# Patient Record
Sex: FEMALE | Race: WHITE | Employment: OTHER | URBAN - METROPOLITAN AREA
[De-identification: names, ages, dates, MRNs, and addresses within clinical notes are randomized per-mention and may not be internally consistent; named-entity substitution may affect disease eponyms.]

---

## 2017-04-20 ENCOUNTER — TELEPHONE (OUTPATIENT)
Dept: OTHER | Age: 82
End: 2017-04-20

## 2017-04-24 ENCOUNTER — OFFICE VISIT (OUTPATIENT)
Dept: CARDIOLOGY CLINIC | Age: 82
End: 2017-04-24

## 2017-04-24 VITALS
SYSTOLIC BLOOD PRESSURE: 118 MMHG | HEIGHT: 59 IN | DIASTOLIC BLOOD PRESSURE: 50 MMHG | WEIGHT: 124 LBS | HEART RATE: 78 BPM | BODY MASS INDEX: 25 KG/M2 | OXYGEN SATURATION: 94 %

## 2017-04-24 DIAGNOSIS — I50.31 ACUTE DIASTOLIC CHF (CONGESTIVE HEART FAILURE) (HCC): Primary | Chronic | ICD-10-CM

## 2017-04-24 DIAGNOSIS — Z86.79 HISTORY OF AORTIC STENOSIS: ICD-10-CM

## 2017-04-24 PROCEDURE — 99214 OFFICE O/P EST MOD 30 MIN: CPT | Performed by: NURSE PRACTITIONER

## 2017-04-24 RX ORDER — CIPROFLOXACIN 250 MG/1
TABLET, FILM COATED ORAL
COMMUNITY
Start: 2017-04-04 | End: 2017-05-02 | Stop reason: ALTCHOICE

## 2017-04-24 RX ORDER — AMLODIPINE BESYLATE 5 MG/1
5 TABLET ORAL DAILY
Status: ON HOLD | COMMUNITY
Start: 2017-01-30 | End: 2019-01-01 | Stop reason: HOSPADM

## 2017-05-01 ENCOUNTER — HOSPITAL ENCOUNTER (OUTPATIENT)
Dept: OTHER | Age: 82
Discharge: HOME | End: 2017-05-01
Attending: NURSE PRACTITIONER | Admitting: NURSE PRACTITIONER

## 2017-05-01 DIAGNOSIS — I50.31 ACUTE DIASTOLIC CHF (CONGESTIVE HEART FAILURE) (HCC): Chronic | ICD-10-CM

## 2017-05-01 LAB
ANION GAP SERPL CALCULATED.3IONS-SCNC: 14 MMOL/L (ref 3–16)
BUN BLDV-MCNC: 34 MG/DL (ref 7–20)
CALCIUM SERPL-MCNC: 9.2 MG/DL (ref 8.3–10.6)
CHLORIDE BLD-SCNC: 99 MMOL/L (ref 99–110)
CO2: 28 MMOL/L (ref 21–32)
CREAT SERPL-MCNC: 1.1 MG/DL (ref 0.6–1.2)
GFR AFRICAN AMERICAN: 55
GFR NON-AFRICAN AMERICAN: 46
GLUCOSE BLD-MCNC: 182 MG/DL (ref 70–99)
HCT VFR BLD CALC: 31.8 % (ref 36–48)
HEMOGLOBIN: 10.3 G/DL (ref 12–16)
MCH RBC QN AUTO: 29.3 PG (ref 26–34)
MCHC RBC AUTO-ENTMCNC: 32.3 G/DL (ref 31–36)
MCV RBC AUTO: 90.5 FL (ref 80–100)
PDW BLD-RTO: 15.1 % (ref 12.4–15.4)
PLATELET # BLD: 219 K/UL (ref 135–450)
PMV BLD AUTO: 8.7 FL (ref 5–10.5)
POTASSIUM SERPL-SCNC: 5 MMOL/L (ref 3.5–5.1)
RBC # BLD: 3.52 M/UL (ref 4–5.2)
SODIUM BLD-SCNC: 141 MMOL/L (ref 136–145)
WBC # BLD: 5.5 K/UL (ref 4–11)

## 2017-05-03 ENCOUNTER — HOSPITAL ENCOUNTER (OUTPATIENT)
Dept: ONCOLOGY | Age: 82
Discharge: HOME | End: 2017-05-31
Admitting: NURSE PRACTITIONER

## 2017-05-03 ENCOUNTER — OFFICE VISIT (OUTPATIENT)
Dept: CARDIOLOGY CLINIC | Age: 82
End: 2017-05-03

## 2017-05-03 VITALS
OXYGEN SATURATION: 100 % | HEIGHT: 59 IN | BODY MASS INDEX: 25.16 KG/M2 | SYSTOLIC BLOOD PRESSURE: 126 MMHG | WEIGHT: 124.8 LBS | HEART RATE: 76 BPM | DIASTOLIC BLOOD PRESSURE: 52 MMHG | RESPIRATION RATE: 16 BRPM

## 2017-05-03 DIAGNOSIS — I50.32 CHRONIC DIASTOLIC HEART FAILURE (HCC): ICD-10-CM

## 2017-05-03 DIAGNOSIS — I10 ESSENTIAL HYPERTENSION: ICD-10-CM

## 2017-05-03 DIAGNOSIS — R06.02 SHORTNESS OF BREATH: Primary | ICD-10-CM

## 2017-05-03 PROCEDURE — 99214 OFFICE O/P EST MOD 30 MIN: CPT | Performed by: NURSE PRACTITIONER

## 2017-05-03 RX ORDER — FUROSEMIDE 10 MG/ML
40 INJECTION INTRAMUSCULAR; INTRAVENOUS ONCE
Status: COMPLETED | OUTPATIENT
Start: 2017-05-03 | End: 2017-05-03

## 2017-05-03 RX ORDER — SODIUM CHLORIDE 0.9 % (FLUSH) 0.9 %
SYRINGE (ML) INJECTION
Status: DISPENSED
Start: 2017-05-03 | End: 2017-05-04

## 2017-05-03 RX ADMIN — FUROSEMIDE 40 MG: 10 INJECTION INTRAMUSCULAR; INTRAVENOUS at 13:22

## 2017-05-08 ENCOUNTER — HOSPITAL ENCOUNTER (OUTPATIENT)
Dept: OTHER | Age: 82
Discharge: HOME | End: 2017-05-08
Attending: NURSE PRACTITIONER | Admitting: NURSE PRACTITIONER

## 2017-05-08 DIAGNOSIS — R06.02 SHORTNESS OF BREATH: ICD-10-CM

## 2017-05-08 LAB
ANION GAP SERPL CALCULATED.3IONS-SCNC: 17 MMOL/L (ref 3–16)
BUN BLDV-MCNC: 32 MG/DL (ref 7–20)
CALCIUM SERPL-MCNC: 9.6 MG/DL (ref 8.3–10.6)
CHLORIDE BLD-SCNC: 98 MMOL/L (ref 99–110)
CO2: 27 MMOL/L (ref 21–32)
CREAT SERPL-MCNC: 1 MG/DL (ref 0.6–1.2)
GFR AFRICAN AMERICAN: >60
GFR NON-AFRICAN AMERICAN: 51
GLUCOSE BLD-MCNC: 186 MG/DL (ref 70–99)
POTASSIUM SERPL-SCNC: 4.4 MMOL/L (ref 3.5–5.1)
PRO-BNP: 676 PG/ML (ref 0–449)
SODIUM BLD-SCNC: 142 MMOL/L (ref 136–145)

## 2017-05-09 ENCOUNTER — TELEPHONE (OUTPATIENT)
Dept: CARDIOLOGY CLINIC | Age: 82
End: 2017-05-09

## 2017-05-09 DIAGNOSIS — I50.22 CHRONIC SYSTOLIC HEART FAILURE (HCC): Primary | ICD-10-CM

## 2017-05-11 ENCOUNTER — OFFICE VISIT (OUTPATIENT)
Dept: CARDIOLOGY CLINIC | Age: 82
End: 2017-05-11

## 2017-05-11 VITALS
BODY MASS INDEX: 24.39 KG/M2 | DIASTOLIC BLOOD PRESSURE: 70 MMHG | WEIGHT: 121 LBS | SYSTOLIC BLOOD PRESSURE: 110 MMHG | HEIGHT: 59 IN | HEART RATE: 84 BPM

## 2017-05-11 DIAGNOSIS — Z86.79 HISTORY OF AORTIC STENOSIS: ICD-10-CM

## 2017-05-11 DIAGNOSIS — I35.0 NONRHEUMATIC AORTIC VALVE STENOSIS: ICD-10-CM

## 2017-05-11 DIAGNOSIS — R06.02 SHORTNESS OF BREATH: ICD-10-CM

## 2017-05-11 DIAGNOSIS — Z95.2 S/P TAVR (TRANSCATHETER AORTIC VALVE REPLACEMENT): Primary | ICD-10-CM

## 2017-05-11 PROCEDURE — 99213 OFFICE O/P EST LOW 20 MIN: CPT | Performed by: INTERNAL MEDICINE

## 2017-05-16 ENCOUNTER — OFFICE VISIT (OUTPATIENT)
Dept: PULMONOLOGY | Age: 82
End: 2017-05-16

## 2017-05-16 VITALS — DIASTOLIC BLOOD PRESSURE: 77 MMHG | SYSTOLIC BLOOD PRESSURE: 140 MMHG | OXYGEN SATURATION: 95 % | HEART RATE: 92 BPM

## 2017-05-16 DIAGNOSIS — R06.02 SHORTNESS OF BREATH: Primary | ICD-10-CM

## 2017-05-16 DIAGNOSIS — J96.11 CHRONIC HYPOXEMIC RESPIRATORY FAILURE (HCC): ICD-10-CM

## 2017-05-16 DIAGNOSIS — J44.9 COPD, MILD (HCC): ICD-10-CM

## 2017-05-16 DIAGNOSIS — J43.2 CENTRILOBULAR EMPHYSEMA (HCC): ICD-10-CM

## 2017-05-16 PROCEDURE — 99204 OFFICE O/P NEW MOD 45 MIN: CPT | Performed by: INTERNAL MEDICINE

## 2017-05-16 RX ORDER — ALBUTEROL SULFATE 2.5 MG/3ML
2.5 SOLUTION RESPIRATORY (INHALATION) 3 TIMES DAILY
COMMUNITY

## 2017-05-16 ASSESSMENT — ENCOUNTER SYMPTOMS
NAUSEA: 0
SINUS PRESSURE: 0
DIARRHEA: 0
CONSTIPATION: 0
ABDOMINAL PAIN: 0

## 2017-05-22 ENCOUNTER — HOSPITAL ENCOUNTER (OUTPATIENT)
Dept: OTHER | Age: 82
Discharge: HOME | End: 2017-05-22
Attending: NURSE PRACTITIONER | Admitting: NURSE PRACTITIONER

## 2017-05-22 DIAGNOSIS — I50.22 CHRONIC SYSTOLIC HEART FAILURE (HCC): ICD-10-CM

## 2017-05-22 LAB
ANION GAP SERPL CALCULATED.3IONS-SCNC: 15 MMOL/L (ref 3–16)
BUN BLDV-MCNC: 34 MG/DL (ref 7–20)
CALCIUM SERPL-MCNC: 9.3 MG/DL (ref 8.3–10.6)
CHLORIDE BLD-SCNC: 99 MMOL/L (ref 99–110)
CO2: 27 MMOL/L (ref 21–32)
CREAT SERPL-MCNC: 1.2 MG/DL (ref 0.6–1.2)
GFR AFRICAN AMERICAN: 50
GFR NON-AFRICAN AMERICAN: 41
GLUCOSE BLD-MCNC: 190 MG/DL (ref 70–99)
POTASSIUM SERPL-SCNC: 4.9 MMOL/L (ref 3.5–5.1)
PRO-BNP: 519 PG/ML (ref 0–449)
SODIUM BLD-SCNC: 141 MMOL/L (ref 136–145)

## 2017-05-23 ENCOUNTER — TELEPHONE (OUTPATIENT)
Dept: CARDIOLOGY CLINIC | Age: 82
End: 2017-05-23

## 2017-05-24 RX ORDER — ALBUTEROL SULFATE 2.5 MG/3ML
2.5 SOLUTION RESPIRATORY (INHALATION) 3 TIMES DAILY
Qty: 100 EACH | Refills: 11 | Status: SHIPPED | OUTPATIENT
Start: 2017-05-24 | End: 2017-07-19 | Stop reason: SDUPTHER

## 2017-07-20 RX ORDER — ALBUTEROL SULFATE 2.5 MG/3ML
SOLUTION RESPIRATORY (INHALATION)
Qty: 750 ML | Refills: 11 | Status: SHIPPED | OUTPATIENT
Start: 2017-07-20 | End: 2017-08-16 | Stop reason: ALTCHOICE

## 2017-08-15 ENCOUNTER — HOSPITAL ENCOUNTER (OUTPATIENT)
Dept: OTHER | Age: 82
Discharge: HOME | End: 2017-08-15
Attending: INTERNAL MEDICINE | Admitting: INTERNAL MEDICINE

## 2017-08-15 LAB
A/G RATIO: 1.2 (ref 1.1–2.2)
ALBUMIN SERPL-MCNC: 3.9 G/DL (ref 3.4–5)
ALP BLD-CCNC: 97 U/L (ref 40–129)
ALT SERPL-CCNC: 9 U/L (ref 10–40)
ANION GAP SERPL CALCULATED.3IONS-SCNC: 15 MMOL/L (ref 3–16)
AST SERPL-CCNC: 14 U/L (ref 15–37)
BILIRUB SERPL-MCNC: <0.2 MG/DL (ref 0–1)
BUN BLDV-MCNC: 36 MG/DL (ref 7–20)
CALCIUM SERPL-MCNC: 9.1 MG/DL (ref 8.3–10.6)
CHLORIDE BLD-SCNC: 97 MMOL/L (ref 99–110)
CO2: 26 MMOL/L (ref 21–32)
CREAT SERPL-MCNC: 1.2 MG/DL (ref 0.6–1.2)
GFR AFRICAN AMERICAN: 50
GFR NON-AFRICAN AMERICAN: 41
GLOBULIN: 3.2 G/DL
GLUCOSE BLD-MCNC: 129 MG/DL (ref 70–99)
HCT VFR BLD CALC: 32.5 % (ref 36–48)
HEMOGLOBIN: 10.8 G/DL (ref 12–16)
MCH RBC QN AUTO: 30 PG (ref 26–34)
MCHC RBC AUTO-ENTMCNC: 33.2 G/DL (ref 31–36)
MCV RBC AUTO: 90.4 FL (ref 80–100)
PDW BLD-RTO: 14.2 % (ref 12.4–15.4)
PLATELET # BLD: 234 K/UL (ref 135–450)
PMV BLD AUTO: 8.8 FL (ref 5–10.5)
POTASSIUM SERPL-SCNC: 5 MMOL/L (ref 3.5–5.1)
RBC # BLD: 3.6 M/UL (ref 4–5.2)
SODIUM BLD-SCNC: 138 MMOL/L (ref 136–145)
TOTAL PROTEIN: 7.1 G/DL (ref 6.4–8.2)
WBC # BLD: 6.6 K/UL (ref 4–11)

## 2017-08-16 ENCOUNTER — OFFICE VISIT (OUTPATIENT)
Dept: PULMONOLOGY | Age: 82
End: 2017-08-16

## 2017-08-16 VITALS — HEART RATE: 78 BPM | SYSTOLIC BLOOD PRESSURE: 150 MMHG | OXYGEN SATURATION: 93 % | DIASTOLIC BLOOD PRESSURE: 59 MMHG

## 2017-08-16 DIAGNOSIS — J96.11 CHRONIC HYPOXEMIC RESPIRATORY FAILURE (HCC): ICD-10-CM

## 2017-08-16 DIAGNOSIS — R06.02 SHORTNESS OF BREATH: Primary | ICD-10-CM

## 2017-08-16 DIAGNOSIS — K44.9 HIATAL HERNIA: ICD-10-CM

## 2017-08-16 DIAGNOSIS — J44.9 COPD, MILD (HCC): ICD-10-CM

## 2017-08-16 LAB
ESTIMATED AVERAGE GLUCOSE: 180 MG/DL
HBA1C MFR BLD: 7.9 %

## 2017-08-16 PROCEDURE — 99214 OFFICE O/P EST MOD 30 MIN: CPT | Performed by: INTERNAL MEDICINE

## 2017-08-16 RX ORDER — DONEPEZIL HYDROCHLORIDE 5 MG/1
10 TABLET, FILM COATED ORAL DAILY
Status: ON HOLD | COMMUNITY
Start: 2017-06-24 | End: 2019-01-01 | Stop reason: HOSPADM

## 2017-11-21 ENCOUNTER — OFFICE VISIT (OUTPATIENT)
Dept: CARDIOLOGY CLINIC | Age: 82
End: 2017-11-21

## 2017-11-21 VITALS
SYSTOLIC BLOOD PRESSURE: 138 MMHG | HEIGHT: 59 IN | WEIGHT: 117 LBS | BODY MASS INDEX: 23.59 KG/M2 | HEART RATE: 90 BPM | DIASTOLIC BLOOD PRESSURE: 62 MMHG

## 2017-11-21 DIAGNOSIS — Z95.2 S/P TAVR (TRANSCATHETER AORTIC VALVE REPLACEMENT): Primary | ICD-10-CM

## 2017-11-21 DIAGNOSIS — I65.29 OCCLUSION AND STENOSIS OF CAROTID ARTERY: ICD-10-CM

## 2017-11-21 DIAGNOSIS — I65.23 OCCLUSION AND STENOSIS OF BILATERAL CAROTID ARTERIES: ICD-10-CM

## 2017-11-21 DIAGNOSIS — I35.0 NONRHEUMATIC AORTIC VALVE STENOSIS: ICD-10-CM

## 2017-11-21 DIAGNOSIS — Z86.79 HISTORY OF AORTIC STENOSIS: ICD-10-CM

## 2017-11-21 DIAGNOSIS — R06.02 SHORTNESS OF BREATH: ICD-10-CM

## 2017-11-21 PROCEDURE — 99213 OFFICE O/P EST LOW 20 MIN: CPT | Performed by: INTERNAL MEDICINE

## 2017-11-21 RX ORDER — NATEGLINIDE 60 MG/1
TABLET ORAL
Refills: 5 | COMMUNITY
Start: 2017-10-25

## 2017-11-21 NOTE — PROGRESS NOTES
[address]   Cardiac Consultation    Referring Provider:  Connor Walters MD     Chief Complaint   Patient presents with    Congestive Heart Failure     No cardiac complaints    Shortness of Breath        History of Present Illness:   Mrs Beatrice Nice is a 80year old female seen today in follow up for her severe aortic stenosis. In March 2014, she came to the ER for an episode of CHF, treated with diuretics. At this time she was found to have iron deficient anemia (8.9/27.9) and placed on iron therapy. She had TAVR with Huizar valve done in 5/2015. She is a nondrinker, nonsmoker. Lives with her daughter. Today, Ben Boyle Denies chest discomfort, SOB, change in exercise tolerance, palpitations or syncope. Patient has been advised on the importance of regular exercise of at least 20-30 minutes daily alternating with aerobic and isometric activities. Past Medical History:   has a past medical history of Back pain; Cancer Skin; CHF (congestive heart failure) ([address] COPD, mild ([address] Diabetes mellitus ([address] Hiatal hernia; Hyperlipidemia; and Hypertension. Surgical History:   has a past surgical history that includes Carpal tunnel release; Hysterectomy; skin biopsy; Cataract removal; and Intertrochanteric hip fract. Surgery (Left, 5/12/2016). Social History:   reports that she has never smoked. She has never used smokeless tobacco. She reports that she does not drink alcohol or use drugs. Family History:  family history includes High Blood Pressure in her sister; Stroke in her brother and sister.      Home Medications:  Outpatient Encounter Prescriptions as of 11/21/2017   Medication Sig Dispense Refill    nateglinide (STARLIX) 60 MG tablet   5    donepezil (ARICEPT) 5 MG tablet Take 1 tablet by mouth daily      albuterol (PROVENTIL) (2.5 MG/3ML) 0.083% nebulizer solution Take 2.5 mg by nebulization three times daily      SITagliptin (JANUVIA) 50 MG tablet Take 50 mg by mouth daily  furosemide (LASIX) 20 MG tablet Take 20 mg by mouth every other day      amLODIPine (NORVASC) 2.5 MG tablet Take 5 mg by mouth daily      albuterol sulfate  (90 BASE) MCG/ACT inhaler Inhale 2 puffs into the lungs every 6 hours as needed for Wheezing      budesonide-formoterol (SYMBICORT) 80-4.5 MCG/ACT AERO Inhale 2 puffs into the lungs 2 times daily      OXYGEN Inhale into the lungs 2 l by cannula 24/7      aspirin 81 MG EC tablet Take 81 mg by mouth daily      pravastatin (PRAVACHOL) 40 MG tablet Take 40 mg by mouth daily      rOPINIRole (REQUIP) 0.5 MG tablet Take 1 mg by mouth nightly Takes 1 or 2 in the evening      ALPRAZolam (XANAX) 0.25 MG tablet Take 0.25 mg by mouth 2 times daily       lisinopril (PRINIVIL;ZESTRIL) 5 MG tablet Take 10 mg by mouth daily       vitamin D (CHOLECALCIFEROL) 1000 UNIT TABS tablet   Take 50,000 Units by mouth once a week       Ferrous Sulfate (IRON) 90 (18 FE) MG TABS Take 1 capsule by mouth 2 times daily       ondansetron (ZOFRAN) 4 MG tablet Take 4 mg by mouth every 8 hours as needed for Nausea or Vomiting (PRN)        No facility-administered encounter medications on file as of 11/21/2017. Allergies:  Review of patient's allergies indicates no known allergies. Review of Systems:   · Constitutional: there has been no unanticipated weight loss. There's been no change in energy level, sleep pattern, or activity level. · Eyes: No visual changes or diplopia. No scleral icterus. · ENT: No Headaches, hearing loss or vertigo. No mouth sores or sore throat. · Cardiovascular: Reviewed in HPI  · Respiratory: No cough or wheezing, no sputum production. No hematemesis. · Gastrointestinal: No abdominal pain, appetite loss, blood in stools. No change in bowel or bladder habits. · Genitourinary: No dysuria, trouble voiding, or hematuria. · Musculoskeletal:  No gait disturbance, weakness or joint complaints.   · Integumentary: No rash or

## 2017-12-05 ENCOUNTER — HOSPITAL ENCOUNTER (OUTPATIENT)
Dept: VASCULAR LAB | Age: 82
Discharge: HOME | End: 2017-12-05
Attending: INTERNAL MEDICINE | Admitting: INTERNAL MEDICINE

## 2017-12-05 DIAGNOSIS — I65.23 OCCLUSION AND STENOSIS OF BILATERAL CAROTID ARTERIES: ICD-10-CM

## 2017-12-08 ENCOUNTER — HOSPITAL ENCOUNTER (OUTPATIENT)
Dept: VASCULAR LAB | Age: 82
Discharge: HOME | End: 2017-12-08
Attending: INTERNAL MEDICINE | Admitting: INTERNAL MEDICINE

## 2017-12-08 DIAGNOSIS — I65.29 OCCLUSION AND STENOSIS OF CAROTID ARTERY: ICD-10-CM

## 2017-12-08 DIAGNOSIS — I65.29 OCCLUSION AND STENOSIS OF UNSPECIFIED CAROTID ARTERY: ICD-10-CM

## 2017-12-08 DIAGNOSIS — I65.23 OCCLUSION AND STENOSIS OF BILATERAL CAROTID ARTERIES: ICD-10-CM

## 2018-01-01 ENCOUNTER — OFFICE VISIT (OUTPATIENT)
Dept: PULMONOLOGY | Age: 83
End: 2018-01-01

## 2018-01-01 VITALS — DIASTOLIC BLOOD PRESSURE: 58 MMHG | SYSTOLIC BLOOD PRESSURE: 125 MMHG | HEART RATE: 87 BPM | OXYGEN SATURATION: 91 %

## 2018-01-01 DIAGNOSIS — J43.2 CENTRILOBULAR EMPHYSEMA (HCC): ICD-10-CM

## 2018-01-01 DIAGNOSIS — J44.9 COPD, MILD (HCC): ICD-10-CM

## 2018-01-01 DIAGNOSIS — R06.02 SHORTNESS OF BREATH: Primary | ICD-10-CM

## 2018-01-01 DIAGNOSIS — J96.11 CHRONIC HYPOXEMIC RESPIRATORY FAILURE (HCC): ICD-10-CM

## 2018-01-01 PROCEDURE — 99214 OFFICE O/P EST MOD 30 MIN: CPT | Performed by: INTERNAL MEDICINE

## 2018-02-19 ENCOUNTER — OFFICE VISIT (OUTPATIENT)
Dept: PULMONOLOGY | Age: 83
End: 2018-02-19

## 2018-02-19 VITALS
WEIGHT: 117 LBS | RESPIRATION RATE: 18 BRPM | OXYGEN SATURATION: 94 % | HEIGHT: 59 IN | HEART RATE: 89 BPM | SYSTOLIC BLOOD PRESSURE: 150 MMHG | BODY MASS INDEX: 23.59 KG/M2 | DIASTOLIC BLOOD PRESSURE: 69 MMHG

## 2018-02-19 DIAGNOSIS — R06.02 SHORTNESS OF BREATH: Primary | ICD-10-CM

## 2018-02-19 DIAGNOSIS — J44.9 COPD, MILD (HCC): ICD-10-CM

## 2018-02-19 DIAGNOSIS — J96.11 CHRONIC HYPOXEMIC RESPIRATORY FAILURE (HCC): ICD-10-CM

## 2018-02-19 DIAGNOSIS — J43.2 CENTRILOBULAR EMPHYSEMA (HCC): ICD-10-CM

## 2018-02-19 PROCEDURE — 99214 OFFICE O/P EST MOD 30 MIN: CPT | Performed by: INTERNAL MEDICINE

## 2018-02-19 RX ORDER — ONDANSETRON 4 MG/1
4 TABLET, FILM COATED ORAL EVERY 8 HOURS PRN
Qty: 15 TABLET | Refills: 0 | Status: SHIPPED | OUTPATIENT
Start: 2018-02-19 | End: 2018-01-01 | Stop reason: ALTCHOICE

## 2018-02-19 NOTE — PROGRESS NOTES
Encounters:   02/19/18 (!) 150/69   11/21/17 138/62   08/16/17 (!) 150/59        History   Smoking Status    Never Smoker   Smokeless Tobacco    Never Used

## 2018-08-21 NOTE — PROGRESS NOTES
[facility]  Progress Note  Yamile Dukes MD       Nancy Yates   YOB: 1918    Date of Visit:  8/21/2018    Assessment/Plan:  1. Shortness of breath  Overall, she is doing quite well    2. COPD, mild (HCC)  Continue Symbicort and albuterol    3. Chronic hypoxemic respiratory failure (HCC)  O2 sats are acceptable on supplemental O2. The patient benefits from the use of supplemental O2.      4. Hiatal hernia/GERD/aspiration  Continue swallowing precautions as discussed. FOLLOW UP: 6 months      Chief Complaint   Patient presents with    6 Month Follow-Up       HPI  The patient presents with a chief complaint of shortness of breath. The patient is oxygen dependent. She actually has been doing well since her last appointment. She is taking Symbicort twice daily with a spacer. She is taking albuterol 2-3 times daily. The addition of albuterol has been helpful. She is known to have some heart disease and had TAVR in the past as well. Review of Systems  As documented in HPI     Physical Exam:  Well developed, well nourished  Alert and oriented  Sclera is clear  No cervical adenopathy  No JVD. Chest examination is clear. Cardiac examination reveals regular rate and rhythm without murmur, gallop or rub. The abdomen is soft, nontender and nondistended. There is no clubbing, cyanosis or edema of the extremities. There is no obvious skin rash. No focal neuro deficicts  Normal mood and affect    No Known Allergies  Prior to Visit Medications    Medication Sig Taking?  Authorizing Provider   nateglinide (STARLIX) 60 MG tablet  Yes Historical Provider, MD   donepezil (ARICEPT) 5 MG tablet Take 10 mg by mouth daily  Yes Historical Provider, MD   albuterol (PROVENTIL) (2.5 MG/3ML) 0.083% nebulizer solution Take 2.5 mg by nebulization three times daily Yes Historical Provider, MD   SITagliptin (JANUVIA) 50 MG tablet Take 50 mg by mouth daily Yes Historical Provider, MD   furosemide (LASIX) 20 MG tablet Take 20 mg by mouth every other day Yes Historical Provider, MD   amLODIPine (NORVASC) 2.5 MG tablet Take 5 mg by mouth daily Yes Historical Provider, MD   albuterol sulfate  (90 BASE) MCG/ACT inhaler Inhale 2 puffs into the lungs every 6 hours as needed for Wheezing Yes Historical Provider, MD   budesonide-formoterol (SYMBICORT) 80-4.5 MCG/ACT AERO Inhale 2 puffs into the lungs 2 times daily Yes Historical Provider, MD   OXYGEN Inhale into the lungs 2 l by cannula 24/7 Yes Historical Provider, MD   aspirin 81 MG EC tablet Take 81 mg by mouth daily Yes Historical Provider, MD   pravastatin (PRAVACHOL) 40 MG tablet Take 40 mg by mouth daily Yes Historical Provider, MD   rOPINIRole (REQUIP) 0.5 MG tablet Take 1 mg by mouth nightly Takes 1 or 2 in the evening Yes Historical Provider, MD   ALPRAZolam (XANAX) 0.25 MG tablet Take 0.25 mg by mouth 2 times daily  Yes Historical Provider, MD   lisinopril (PRINIVIL;ZESTRIL) 5 MG tablet Take 10 mg by mouth daily  Yes Historical Provider, MD   vitamin D (CHOLECALCIFEROL) 1000 UNIT TABS tablet   Take 50,000 Units by mouth once a week  Yes Historical Provider, MD   Ferrous Sulfate (IRON) 90 (18 FE) MG TABS Take 1 capsule by mouth 2 times daily  Yes Historical Provider, MD       Vitals:    08/21/18 1339   BP: (!) 125/58   Pulse: 87   SpO2: 91%     There is no height or weight on file to calculate BMI.      Wt Readings from Last 3 Encounters:   02/19/18 117 lb (53.1 kg)   11/21/17 117 lb (53.1 kg)   05/11/17 121 lb (54.9 kg)     BP Readings from Last 3 Encounters:   08/21/18 (!) 125/58   02/19/18 (!) 150/69   11/21/17 138/62        History   Smoking Status    Never Smoker   Smokeless Tobacco    Never Used

## 2018-08-21 NOTE — LETTER
[facility], [address] [facility], [address] [place] [address]  Phone: [phone]  Fax: [phone]        August 21, 2018       Patient: Román Araujo   MR Number: [id number]   YOB: 1918   Date of Visit: 8/21/2018       Dear Dr. Kisha Kumarian:    Thank you for the request for consultation for Catalina Weber. Below are the relevant portions of my assessment and plan of care. If you have questions, please do not hesitate to call me. I look forward to following Abilio Randolph along with you.     Sincerely,        Lucio Sprague MD

## 2019-01-01 ENCOUNTER — APPOINTMENT (OUTPATIENT)
Dept: GENERAL RADIOLOGY | Age: 84
DRG: 871 | End: 2019-01-01
Payer: MEDICARE

## 2019-01-01 ENCOUNTER — APPOINTMENT (OUTPATIENT)
Dept: CT IMAGING | Age: 84
DRG: 871 | End: 2019-01-01
Payer: MEDICARE

## 2019-01-01 ENCOUNTER — OFFICE VISIT (OUTPATIENT)
Dept: PULMONOLOGY | Age: 84
End: 2019-01-01
Payer: MEDICARE

## 2019-01-01 ENCOUNTER — HOSPITAL ENCOUNTER (INPATIENT)
Age: 84
LOS: 8 days | Discharge: HOSPICE | DRG: 871 | End: 2019-05-30
Attending: EMERGENCY MEDICINE | Admitting: INTERNAL MEDICINE
Payer: MEDICARE

## 2019-01-01 VITALS
HEART RATE: 112 BPM | DIASTOLIC BLOOD PRESSURE: 73 MMHG | SYSTOLIC BLOOD PRESSURE: 154 MMHG | WEIGHT: 124.2 LBS | HEIGHT: 59 IN | OXYGEN SATURATION: 90 % | BODY MASS INDEX: 25.04 KG/M2 | TEMPERATURE: 97 F | RESPIRATION RATE: 20 BRPM

## 2019-01-01 VITALS — OXYGEN SATURATION: 95 % | HEART RATE: 76 BPM | DIASTOLIC BLOOD PRESSURE: 64 MMHG | SYSTOLIC BLOOD PRESSURE: 139 MMHG

## 2019-01-01 DIAGNOSIS — K44.9 HIATAL HERNIA: ICD-10-CM

## 2019-01-01 DIAGNOSIS — J43.2 CENTRILOBULAR EMPHYSEMA (HCC): ICD-10-CM

## 2019-01-01 DIAGNOSIS — J96.11 CHRONIC HYPOXEMIC RESPIRATORY FAILURE (HCC): ICD-10-CM

## 2019-01-01 DIAGNOSIS — J18.9 MULTIFOCAL PNEUMONIA: ICD-10-CM

## 2019-01-01 DIAGNOSIS — I48.91 RAPID ATRIAL FIBRILLATION (HCC): Primary | ICD-10-CM

## 2019-01-01 DIAGNOSIS — J44.9 COPD, MILD (HCC): ICD-10-CM

## 2019-01-01 DIAGNOSIS — R06.02 SHORTNESS OF BREATH: Primary | ICD-10-CM

## 2019-01-01 DIAGNOSIS — R65.20 SEVERE SEPSIS (HCC): ICD-10-CM

## 2019-01-01 DIAGNOSIS — A41.9 SEVERE SEPSIS (HCC): ICD-10-CM

## 2019-01-01 DIAGNOSIS — Z51.5 HOSPICE CARE: ICD-10-CM

## 2019-01-01 LAB
A/G RATIO: 0.8 (ref 1.1–2.2)
ALBUMIN SERPL-MCNC: 3.5 G/DL (ref 3.4–5)
ALP BLD-CCNC: 107 U/L (ref 40–129)
ALT SERPL-CCNC: 9 U/L (ref 10–40)
ANION GAP SERPL CALCULATED.3IONS-SCNC: 11 MMOL/L (ref 3–16)
ANION GAP SERPL CALCULATED.3IONS-SCNC: 12 MMOL/L (ref 3–16)
ANION GAP SERPL CALCULATED.3IONS-SCNC: 13 MMOL/L (ref 3–16)
ANION GAP SERPL CALCULATED.3IONS-SCNC: 13 MMOL/L (ref 3–16)
ANION GAP SERPL CALCULATED.3IONS-SCNC: 16 MMOL/L (ref 3–16)
ANION GAP SERPL CALCULATED.3IONS-SCNC: 16 MMOL/L (ref 3–16)
ANION GAP SERPL CALCULATED.3IONS-SCNC: 17 MMOL/L (ref 3–16)
ANION GAP SERPL CALCULATED.3IONS-SCNC: 17 MMOL/L (ref 3–16)
ANISOCYTOSIS: ABNORMAL
APTT: 111.1 SEC (ref 26–36)
APTT: 24.8 SEC (ref 26–36)
APTT: 28.7 SEC (ref 26–36)
APTT: 37.9 SEC (ref 26–36)
APTT: 41.6 SEC (ref 26–36)
APTT: 43.2 SEC (ref 26–36)
APTT: 45.6 SEC (ref 26–36)
APTT: 48.6 SEC (ref 26–36)
APTT: 57.1 SEC (ref 26–36)
APTT: 82.1 SEC (ref 26–36)
AST SERPL-CCNC: 12 U/L (ref 15–37)
BANDED NEUTROPHILS RELATIVE PERCENT: 14 % (ref 0–7)
BANDED NEUTROPHILS RELATIVE PERCENT: 2 % (ref 0–7)
BANDED NEUTROPHILS RELATIVE PERCENT: 3 % (ref 0–7)
BASOPHILS ABSOLUTE: 0 K/UL (ref 0–0.2)
BASOPHILS RELATIVE PERCENT: 0 %
BASOPHILS RELATIVE PERCENT: 0.1 %
BASOPHILS RELATIVE PERCENT: 0.3 %
BILIRUB SERPL-MCNC: 0.3 MG/DL (ref 0–1)
BILIRUBIN URINE: NEGATIVE
BILIRUBIN URINE: NEGATIVE
BLOOD CULTURE, ROUTINE: NORMAL
BLOOD, URINE: NEGATIVE
BLOOD, URINE: NEGATIVE
BUN BLDV-MCNC: 44 MG/DL (ref 7–20)
BUN BLDV-MCNC: 47 MG/DL (ref 7–20)
BUN BLDV-MCNC: 50 MG/DL (ref 7–20)
BUN BLDV-MCNC: 61 MG/DL (ref 7–20)
BUN BLDV-MCNC: 61 MG/DL (ref 7–20)
BUN BLDV-MCNC: 64 MG/DL (ref 7–20)
BUN BLDV-MCNC: 66 MG/DL (ref 7–20)
BUN BLDV-MCNC: 68 MG/DL (ref 7–20)
CALCIUM SERPL-MCNC: 8.1 MG/DL (ref 8.3–10.6)
CALCIUM SERPL-MCNC: 8.5 MG/DL (ref 8.3–10.6)
CALCIUM SERPL-MCNC: 8.6 MG/DL (ref 8.3–10.6)
CALCIUM SERPL-MCNC: 8.9 MG/DL (ref 8.3–10.6)
CALCIUM SERPL-MCNC: 9 MG/DL (ref 8.3–10.6)
CALCIUM SERPL-MCNC: 9 MG/DL (ref 8.3–10.6)
CALCIUM SERPL-MCNC: 9.2 MG/DL (ref 8.3–10.6)
CALCIUM SERPL-MCNC: 9.3 MG/DL (ref 8.3–10.6)
CHLORIDE BLD-SCNC: 100 MMOL/L (ref 99–110)
CHLORIDE BLD-SCNC: 101 MMOL/L (ref 99–110)
CHLORIDE BLD-SCNC: 102 MMOL/L (ref 99–110)
CHLORIDE BLD-SCNC: 103 MMOL/L (ref 99–110)
CHLORIDE BLD-SCNC: 96 MMOL/L (ref 99–110)
CHLORIDE BLD-SCNC: 97 MMOL/L (ref 99–110)
CHLORIDE BLD-SCNC: 97 MMOL/L (ref 99–110)
CHLORIDE BLD-SCNC: 99 MMOL/L (ref 99–110)
CLARITY: ABNORMAL
CLARITY: ABNORMAL
CO2: 21 MMOL/L (ref 21–32)
CO2: 21 MMOL/L (ref 21–32)
CO2: 22 MMOL/L (ref 21–32)
CO2: 25 MMOL/L (ref 21–32)
CO2: 25 MMOL/L (ref 21–32)
CO2: 27 MMOL/L (ref 21–32)
CO2: 28 MMOL/L (ref 21–32)
CO2: 28 MMOL/L (ref 21–32)
COLOR: YELLOW
COLOR: YELLOW
COMMENT UA: ABNORMAL
CREAT SERPL-MCNC: 1.1 MG/DL (ref 0.6–1.2)
CREAT SERPL-MCNC: 1.4 MG/DL (ref 0.6–1.2)
CREAT SERPL-MCNC: 1.5 MG/DL (ref 0.6–1.2)
CREAT SERPL-MCNC: 1.5 MG/DL (ref 0.6–1.2)
CREAT SERPL-MCNC: 1.7 MG/DL (ref 0.6–1.2)
CREAT SERPL-MCNC: 1.8 MG/DL (ref 0.6–1.2)
CREATININE URINE: 71.8 MG/DL (ref 28–259)
CULTURE, BLOOD 2: NORMAL
DOHLE BODIES: PRESENT
EKG ATRIAL RATE: 163 BPM
EKG DIAGNOSIS: NORMAL
EKG Q-T INTERVAL: 272 MS
EKG QRS DURATION: 94 MS
EKG QTC CALCULATION (BAZETT): 412 MS
EKG R AXIS: -7 DEGREES
EKG T AXIS: 237 DEGREES
EKG VENTRICULAR RATE: 138 BPM
EOSINOPHIL,URINE: NORMAL
EOSINOPHILS ABSOLUTE: 0 K/UL (ref 0–0.6)
EOSINOPHILS ABSOLUTE: 0.2 K/UL (ref 0–0.6)
EOSINOPHILS ABSOLUTE: 0.3 K/UL (ref 0–0.6)
EOSINOPHILS RELATIVE PERCENT: 0 %
EOSINOPHILS RELATIVE PERCENT: 1 %
EOSINOPHILS RELATIVE PERCENT: 2 %
EPITHELIAL CELLS, UA: 2 /HPF (ref 0–5)
EPITHELIAL CELLS, UA: 3 /HPF (ref 0–5)
GFR AFRICAN AMERICAN: 31
GFR AFRICAN AMERICAN: 33
GFR AFRICAN AMERICAN: 39
GFR AFRICAN AMERICAN: 39
GFR AFRICAN AMERICAN: 42
GFR AFRICAN AMERICAN: 55
GFR NON-AFRICAN AMERICAN: 26
GFR NON-AFRICAN AMERICAN: 28
GFR NON-AFRICAN AMERICAN: 32
GFR NON-AFRICAN AMERICAN: 32
GFR NON-AFRICAN AMERICAN: 34
GFR NON-AFRICAN AMERICAN: 46
GLOBULIN: 4.4 G/DL
GLUCOSE BLD-MCNC: 106 MG/DL (ref 70–99)
GLUCOSE BLD-MCNC: 146 MG/DL (ref 70–99)
GLUCOSE BLD-MCNC: 148 MG/DL (ref 70–99)
GLUCOSE BLD-MCNC: 153 MG/DL (ref 70–99)
GLUCOSE BLD-MCNC: 156 MG/DL (ref 70–99)
GLUCOSE BLD-MCNC: 167 MG/DL (ref 70–99)
GLUCOSE BLD-MCNC: 178 MG/DL (ref 70–99)
GLUCOSE BLD-MCNC: 187 MG/DL (ref 70–99)
GLUCOSE BLD-MCNC: 189 MG/DL (ref 70–99)
GLUCOSE BLD-MCNC: 193 MG/DL (ref 70–99)
GLUCOSE BLD-MCNC: 194 MG/DL (ref 70–99)
GLUCOSE BLD-MCNC: 199 MG/DL (ref 70–99)
GLUCOSE BLD-MCNC: 203 MG/DL (ref 70–99)
GLUCOSE BLD-MCNC: 204 MG/DL (ref 70–99)
GLUCOSE BLD-MCNC: 208 MG/DL (ref 70–99)
GLUCOSE BLD-MCNC: 210 MG/DL (ref 70–99)
GLUCOSE BLD-MCNC: 210 MG/DL (ref 70–99)
GLUCOSE BLD-MCNC: 222 MG/DL (ref 70–99)
GLUCOSE BLD-MCNC: 226 MG/DL (ref 70–99)
GLUCOSE BLD-MCNC: 226 MG/DL (ref 70–99)
GLUCOSE BLD-MCNC: 231 MG/DL (ref 70–99)
GLUCOSE BLD-MCNC: 233 MG/DL (ref 70–99)
GLUCOSE BLD-MCNC: 239 MG/DL (ref 70–99)
GLUCOSE BLD-MCNC: 243 MG/DL (ref 70–99)
GLUCOSE BLD-MCNC: 243 MG/DL (ref 70–99)
GLUCOSE BLD-MCNC: 255 MG/DL (ref 70–99)
GLUCOSE BLD-MCNC: 275 MG/DL (ref 70–99)
GLUCOSE BLD-MCNC: 279 MG/DL (ref 70–99)
GLUCOSE BLD-MCNC: 295 MG/DL (ref 70–99)
GLUCOSE BLD-MCNC: 307 MG/DL (ref 70–99)
GLUCOSE BLD-MCNC: 309 MG/DL (ref 70–99)
GLUCOSE BLD-MCNC: 314 MG/DL (ref 70–99)
GLUCOSE BLD-MCNC: 316 MG/DL (ref 70–99)
GLUCOSE BLD-MCNC: 321 MG/DL (ref 70–99)
GLUCOSE BLD-MCNC: 329 MG/DL (ref 70–99)
GLUCOSE BLD-MCNC: 333 MG/DL (ref 70–99)
GLUCOSE BLD-MCNC: 344 MG/DL (ref 70–99)
GLUCOSE BLD-MCNC: 345 MG/DL (ref 70–99)
GLUCOSE BLD-MCNC: 349 MG/DL (ref 70–99)
GLUCOSE BLD-MCNC: 374 MG/DL (ref 70–99)
GLUCOSE BLD-MCNC: 379 MG/DL (ref 70–99)
GLUCOSE BLD-MCNC: 389 MG/DL (ref 70–99)
GLUCOSE BLD-MCNC: 398 MG/DL (ref 70–99)
GLUCOSE BLD-MCNC: 412 MG/DL (ref 70–99)
GLUCOSE BLD-MCNC: 423 MG/DL (ref 70–99)
GLUCOSE URINE: 100 MG/DL
GLUCOSE URINE: 250 MG/DL
HCT VFR BLD CALC: 28.3 % (ref 36–48)
HCT VFR BLD CALC: 29.8 % (ref 36–48)
HCT VFR BLD CALC: 30.1 % (ref 36–48)
HCT VFR BLD CALC: 30.5 % (ref 36–48)
HCT VFR BLD CALC: 33.1 % (ref 36–48)
HCT VFR BLD CALC: 33.4 % (ref 36–48)
HCT VFR BLD CALC: 33.5 % (ref 36–48)
HCT VFR BLD CALC: 35.4 % (ref 36–48)
HCT VFR BLD CALC: 36.3 % (ref 36–48)
HEMOGLOBIN: 10.7 G/DL (ref 12–16)
HEMOGLOBIN: 10.8 G/DL (ref 12–16)
HEMOGLOBIN: 10.9 G/DL (ref 12–16)
HEMOGLOBIN: 11.2 G/DL (ref 12–16)
HEMOGLOBIN: 11.6 G/DL (ref 12–16)
HEMOGLOBIN: 9.2 G/DL (ref 12–16)
HEMOGLOBIN: 9.6 G/DL (ref 12–16)
HEMOGLOBIN: 9.8 G/DL (ref 12–16)
HEMOGLOBIN: 9.9 G/DL (ref 12–16)
HYALINE CASTS: 9 /LPF (ref 0–8)
INR BLD: 1.07 (ref 0.86–1.14)
KETONES, URINE: NEGATIVE MG/DL
KETONES, URINE: NEGATIVE MG/DL
L. PNEUMOPHILA SEROGP 1 UR AG: NORMAL
LACTIC ACID, SEPSIS: 1.1 MMOL/L (ref 0.4–1.9)
LACTIC ACID, SEPSIS: 2.7 MMOL/L (ref 0.4–1.9)
LACTIC ACID: 1 MMOL/L (ref 0.4–2)
LEFT VENTRICULAR EJECTION FRACTION HIGH VALUE: 65 %
LEFT VENTRICULAR EJECTION FRACTION MODE: NORMAL
LEUKOCYTE ESTERASE, URINE: NEGATIVE
LEUKOCYTE ESTERASE, URINE: NEGATIVE
LV EF: 60 %
LV EF: 63 %
LVEF MODALITY: NORMAL
LYMPHOCYTES ABSOLUTE: 0.3 K/UL (ref 1–5.1)
LYMPHOCYTES ABSOLUTE: 0.3 K/UL (ref 1–5.1)
LYMPHOCYTES ABSOLUTE: 0.4 K/UL (ref 1–5.1)
LYMPHOCYTES ABSOLUTE: 0.9 K/UL (ref 1–5.1)
LYMPHOCYTES ABSOLUTE: 1.1 K/UL (ref 1–5.1)
LYMPHOCYTES ABSOLUTE: 1.3 K/UL (ref 1–5.1)
LYMPHOCYTES ABSOLUTE: 1.3 K/UL (ref 1–5.1)
LYMPHOCYTES RELATIVE PERCENT: 2 %
LYMPHOCYTES RELATIVE PERCENT: 2.2 %
LYMPHOCYTES RELATIVE PERCENT: 2.7 %
LYMPHOCYTES RELATIVE PERCENT: 4 %
LYMPHOCYTES RELATIVE PERCENT: 7 %
LYMPHOCYTES RELATIVE PERCENT: 8 %
LYMPHOCYTES RELATIVE PERCENT: 8 %
MACROCYTES: ABNORMAL
MAGNESIUM: 2.3 MG/DL (ref 1.8–2.4)
MCH RBC QN AUTO: 30.2 PG (ref 26–34)
MCH RBC QN AUTO: 30.3 PG (ref 26–34)
MCH RBC QN AUTO: 30.3 PG (ref 26–34)
MCH RBC QN AUTO: 30.4 PG (ref 26–34)
MCH RBC QN AUTO: 30.4 PG (ref 26–34)
MCH RBC QN AUTO: 30.5 PG (ref 26–34)
MCH RBC QN AUTO: 30.7 PG (ref 26–34)
MCH RBC QN AUTO: 30.7 PG (ref 26–34)
MCH RBC QN AUTO: 30.8 PG (ref 26–34)
MCHC RBC AUTO-ENTMCNC: 31.7 G/DL (ref 31–36)
MCHC RBC AUTO-ENTMCNC: 31.9 G/DL (ref 31–36)
MCHC RBC AUTO-ENTMCNC: 32.1 G/DL (ref 31–36)
MCHC RBC AUTO-ENTMCNC: 32.1 G/DL (ref 31–36)
MCHC RBC AUTO-ENTMCNC: 32.2 G/DL (ref 31–36)
MCHC RBC AUTO-ENTMCNC: 32.3 G/DL (ref 31–36)
MCHC RBC AUTO-ENTMCNC: 32.5 G/DL (ref 31–36)
MCHC RBC AUTO-ENTMCNC: 32.5 G/DL (ref 31–36)
MCHC RBC AUTO-ENTMCNC: 32.8 G/DL (ref 31–36)
MCV RBC AUTO: 92.9 FL (ref 80–100)
MCV RBC AUTO: 93.5 FL (ref 80–100)
MCV RBC AUTO: 93.6 FL (ref 80–100)
MCV RBC AUTO: 94.4 FL (ref 80–100)
MCV RBC AUTO: 94.4 FL (ref 80–100)
MCV RBC AUTO: 94.5 FL (ref 80–100)
MCV RBC AUTO: 94.7 FL (ref 80–100)
MCV RBC AUTO: 95.8 FL (ref 80–100)
MCV RBC AUTO: 97 FL (ref 80–100)
METAMYELOCYTES RELATIVE PERCENT: 1 %
METAMYELOCYTES RELATIVE PERCENT: 2 %
METAMYELOCYTES RELATIVE PERCENT: 2 %
MICROCYTES: ABNORMAL
MICROSCOPIC EXAMINATION: YES
MICROSCOPIC EXAMINATION: YES
MONOCYTES ABSOLUTE: 0.2 K/UL (ref 0–1.3)
MONOCYTES ABSOLUTE: 0.3 K/UL (ref 0–1.3)
MONOCYTES ABSOLUTE: 0.5 K/UL (ref 0–1.3)
MONOCYTES ABSOLUTE: 0.8 K/UL (ref 0–1.3)
MONOCYTES ABSOLUTE: 0.8 K/UL (ref 0–1.3)
MONOCYTES ABSOLUTE: 1.1 K/UL (ref 0–1.3)
MONOCYTES ABSOLUTE: 1.3 K/UL (ref 0–1.3)
MONOCYTES RELATIVE PERCENT: 1 %
MONOCYTES RELATIVE PERCENT: 2.2 %
MONOCYTES RELATIVE PERCENT: 3 %
MONOCYTES RELATIVE PERCENT: 5 %
MONOCYTES RELATIVE PERCENT: 5 %
MONOCYTES RELATIVE PERCENT: 6.7 %
MONOCYTES RELATIVE PERCENT: 8 %
MYELOCYTE PERCENT: 3 %
NEUTROPHILS ABSOLUTE: 11.7 K/UL (ref 1.7–7.7)
NEUTROPHILS ABSOLUTE: 13.3 K/UL (ref 1.7–7.7)
NEUTROPHILS ABSOLUTE: 14.1 K/UL (ref 1.7–7.7)
NEUTROPHILS ABSOLUTE: 14.1 K/UL (ref 1.7–7.7)
NEUTROPHILS ABSOLUTE: 14.7 K/UL (ref 1.7–7.7)
NEUTROPHILS ABSOLUTE: 14.8 K/UL (ref 1.7–7.7)
NEUTROPHILS ABSOLUTE: 21.9 K/UL (ref 1.7–7.7)
NEUTROPHILS RELATIVE PERCENT: 79 %
NEUTROPHILS RELATIVE PERCENT: 81 %
NEUTROPHILS RELATIVE PERCENT: 81 %
NEUTROPHILS RELATIVE PERCENT: 83 %
NEUTROPHILS RELATIVE PERCENT: 90.5 %
NEUTROPHILS RELATIVE PERCENT: 95 %
NEUTROPHILS RELATIVE PERCENT: 95.3 %
NITRITE, URINE: NEGATIVE
NITRITE, URINE: NEGATIVE
OSMOLALITY URINE: 453 MOSM/KG (ref 390–1070)
PDW BLD-RTO: 14.4 % (ref 12.4–15.4)
PDW BLD-RTO: 14.4 % (ref 12.4–15.4)
PDW BLD-RTO: 14.5 % (ref 12.4–15.4)
PDW BLD-RTO: 14.6 % (ref 12.4–15.4)
PDW BLD-RTO: 14.6 % (ref 12.4–15.4)
PDW BLD-RTO: 14.7 % (ref 12.4–15.4)
PDW BLD-RTO: 14.8 % (ref 12.4–15.4)
PDW BLD-RTO: 14.9 % (ref 12.4–15.4)
PDW BLD-RTO: 15 % (ref 12.4–15.4)
PERFORMED ON: ABNORMAL
PH UA: 5 (ref 5–8)
PH UA: 5 (ref 5–8)
PLATELET # BLD: 258 K/UL (ref 135–450)
PLATELET # BLD: 272 K/UL (ref 135–450)
PLATELET # BLD: 279 K/UL (ref 135–450)
PLATELET # BLD: 288 K/UL (ref 135–450)
PLATELET # BLD: 291 K/UL (ref 135–450)
PLATELET # BLD: 300 K/UL (ref 135–450)
PLATELET # BLD: 305 K/UL (ref 135–450)
PLATELET # BLD: 330 K/UL (ref 135–450)
PLATELET # BLD: 365 K/UL (ref 135–450)
PLATELET SLIDE REVIEW: ADEQUATE
PMV BLD AUTO: 8.5 FL (ref 5–10.5)
PMV BLD AUTO: 8.7 FL (ref 5–10.5)
PMV BLD AUTO: 8.7 FL (ref 5–10.5)
PMV BLD AUTO: 8.9 FL (ref 5–10.5)
PMV BLD AUTO: 9 FL (ref 5–10.5)
PMV BLD AUTO: 9.1 FL (ref 5–10.5)
PMV BLD AUTO: 9.2 FL (ref 5–10.5)
PMV BLD AUTO: 9.2 FL (ref 5–10.5)
PMV BLD AUTO: 9.6 FL (ref 5–10.5)
POIKILOCYTES: ABNORMAL
POLYCHROMASIA: ABNORMAL
POLYCHROMASIA: ABNORMAL
POTASSIUM REFLEX MAGNESIUM: 4.7 MMOL/L (ref 3.5–5.1)
POTASSIUM REFLEX MAGNESIUM: 4.8 MMOL/L (ref 3.5–5.1)
POTASSIUM REFLEX MAGNESIUM: 5 MMOL/L (ref 3.5–5.1)
POTASSIUM REFLEX MAGNESIUM: 5.1 MMOL/L (ref 3.5–5.1)
POTASSIUM REFLEX MAGNESIUM: 5.3 MMOL/L (ref 3.5–5.1)
POTASSIUM SERPL-SCNC: 3.9 MMOL/L (ref 3.5–5.1)
POTASSIUM SERPL-SCNC: 4.2 MMOL/L (ref 3.5–5.1)
POTASSIUM SERPL-SCNC: 4.3 MMOL/L (ref 3.5–5.1)
PRO-BNP: 6849 PG/ML (ref 0–449)
PROTEIN UA: ABNORMAL MG/DL
PROTEIN UA: ABNORMAL MG/DL
PROTHROMBIN TIME: 12.2 SEC (ref 9.8–13)
RBC # BLD: 3 M/UL (ref 4–5.2)
RBC # BLD: 3.15 M/UL (ref 4–5.2)
RBC # BLD: 3.22 M/UL (ref 4–5.2)
RBC # BLD: 3.29 M/UL (ref 4–5.2)
RBC # BLD: 3.5 M/UL (ref 4–5.2)
RBC # BLD: 3.53 M/UL (ref 4–5.2)
RBC # BLD: 3.54 M/UL (ref 4–5.2)
RBC # BLD: 3.65 M/UL (ref 4–5.2)
RBC # BLD: 3.84 M/UL (ref 4–5.2)
RBC # BLD: NORMAL 10*6/UL
RBC UA: 27 /HPF (ref 0–4)
RBC UA: ABNORMAL /HPF (ref 0–2)
REASON FOR REJECTION: NORMAL
REJECTED TEST: NORMAL
SLIDE REVIEW: ABNORMAL
SODIUM BLD-SCNC: 134 MMOL/L (ref 136–145)
SODIUM BLD-SCNC: 135 MMOL/L (ref 136–145)
SODIUM BLD-SCNC: 137 MMOL/L (ref 136–145)
SODIUM BLD-SCNC: 138 MMOL/L (ref 136–145)
SODIUM BLD-SCNC: 138 MMOL/L (ref 136–145)
SODIUM BLD-SCNC: 141 MMOL/L (ref 136–145)
SODIUM BLD-SCNC: 141 MMOL/L (ref 136–145)
SODIUM BLD-SCNC: 143 MMOL/L (ref 136–145)
SODIUM URINE: 26 MMOL/L
SPECIFIC GRAVITY UA: 1.02 (ref 1–1.03)
SPECIFIC GRAVITY UA: 1.02 (ref 1–1.03)
STREP PNEUMONIAE ANTIGEN, URINE: ABNORMAL
TOTAL PROTEIN: 7.9 G/DL (ref 6.4–8.2)
TOXIC GRANULATION: PRESENT
TROPONIN: <0.01 NG/ML
URINE TYPE: ABNORMAL
URINE TYPE: ABNORMAL
UROBILINOGEN, URINE: 0.2 E.U./DL
UROBILINOGEN, URINE: 0.2 E.U./DL
WBC # BLD: 12.2 K/UL (ref 4–11)
WBC # BLD: 15.2 K/UL (ref 4–11)
WBC # BLD: 15.7 K/UL (ref 4–11)
WBC # BLD: 16.2 K/UL (ref 4–11)
WBC # BLD: 16.4 K/UL (ref 4–11)
WBC # BLD: 16.6 K/UL (ref 4–11)
WBC # BLD: 16.8 K/UL (ref 4–11)
WBC # BLD: 16.8 K/UL (ref 4–11)
WBC # BLD: 23.1 K/UL (ref 4–11)
WBC UA: 2 /HPF (ref 0–5)
WBC UA: 3 /HPF (ref 0–5)

## 2019-01-01 PROCEDURE — 94640 AIRWAY INHALATION TREATMENT: CPT

## 2019-01-01 PROCEDURE — 84300 ASSAY OF URINE SODIUM: CPT

## 2019-01-01 PROCEDURE — 87040 BLOOD CULTURE FOR BACTERIA: CPT

## 2019-01-01 PROCEDURE — 2580000003 HC RX 258

## 2019-01-01 PROCEDURE — 99233 SBSQ HOSP IP/OBS HIGH 50: CPT | Performed by: INTERNAL MEDICINE

## 2019-01-01 PROCEDURE — 6370000000 HC RX 637 (ALT 250 FOR IP): Performed by: INTERNAL MEDICINE

## 2019-01-01 PROCEDURE — 6360000002 HC RX W HCPCS: Performed by: INTERNAL MEDICINE

## 2019-01-01 PROCEDURE — 2580000003 HC RX 258: Performed by: INTERNAL MEDICINE

## 2019-01-01 PROCEDURE — 71045 X-RAY EXAM CHEST 1 VIEW: CPT

## 2019-01-01 PROCEDURE — 80048 BASIC METABOLIC PNL TOTAL CA: CPT

## 2019-01-01 PROCEDURE — 1200000000 HC SEMI PRIVATE

## 2019-01-01 PROCEDURE — 36415 COLL VENOUS BLD VENIPUNCTURE: CPT

## 2019-01-01 PROCEDURE — 2500000003 HC RX 250 WO HCPCS: Performed by: EMERGENCY MEDICINE

## 2019-01-01 PROCEDURE — 2700000000 HC OXYGEN THERAPY PER DAY

## 2019-01-01 PROCEDURE — 85025 COMPLETE CBC W/AUTO DIFF WBC: CPT

## 2019-01-01 PROCEDURE — 93306 TTE W/DOPPLER COMPLETE: CPT

## 2019-01-01 PROCEDURE — 97530 THERAPEUTIC ACTIVITIES: CPT

## 2019-01-01 PROCEDURE — 80053 COMPREHEN METABOLIC PANEL: CPT

## 2019-01-01 PROCEDURE — 84484 ASSAY OF TROPONIN QUANT: CPT

## 2019-01-01 PROCEDURE — 83880 ASSAY OF NATRIURETIC PEPTIDE: CPT

## 2019-01-01 PROCEDURE — 85027 COMPLETE CBC AUTOMATED: CPT

## 2019-01-01 PROCEDURE — 2580000003 HC RX 258: Performed by: EMERGENCY MEDICINE

## 2019-01-01 PROCEDURE — 81001 URINALYSIS AUTO W/SCOPE: CPT

## 2019-01-01 PROCEDURE — 85730 THROMBOPLASTIN TIME PARTIAL: CPT

## 2019-01-01 PROCEDURE — 97161 PT EVAL LOW COMPLEX 20 MIN: CPT

## 2019-01-01 PROCEDURE — 93005 ELECTROCARDIOGRAM TRACING: CPT | Performed by: EMERGENCY MEDICINE

## 2019-01-01 PROCEDURE — 6360000002 HC RX W HCPCS: Performed by: NURSE PRACTITIONER

## 2019-01-01 PROCEDURE — 6370000000 HC RX 637 (ALT 250 FOR IP): Performed by: NURSE PRACTITIONER

## 2019-01-01 PROCEDURE — 94761 N-INVAS EAR/PLS OXIMETRY MLT: CPT

## 2019-01-01 PROCEDURE — 6370000000 HC RX 637 (ALT 250 FOR IP): Performed by: HOSPITALIST

## 2019-01-01 PROCEDURE — 2580000003 HC RX 258: Performed by: NURSE PRACTITIONER

## 2019-01-01 PROCEDURE — 94760 N-INVAS EAR/PLS OXIMETRY 1: CPT

## 2019-01-01 PROCEDURE — 83605 ASSAY OF LACTIC ACID: CPT

## 2019-01-01 PROCEDURE — 99223 1ST HOSP IP/OBS HIGH 75: CPT | Performed by: INTERNAL MEDICINE

## 2019-01-01 PROCEDURE — 99285 EMERGENCY DEPT VISIT HI MDM: CPT

## 2019-01-01 PROCEDURE — 83735 ASSAY OF MAGNESIUM: CPT

## 2019-01-01 PROCEDURE — 83935 ASSAY OF URINE OSMOLALITY: CPT

## 2019-01-01 PROCEDURE — 87449 NOS EACH ORGANISM AG IA: CPT

## 2019-01-01 PROCEDURE — 99232 SBSQ HOSP IP/OBS MODERATE 35: CPT | Performed by: INTERNAL MEDICINE

## 2019-01-01 PROCEDURE — 96374 THER/PROPH/DIAG INJ IV PUSH: CPT

## 2019-01-01 PROCEDURE — 71250 CT THORAX DX C-: CPT

## 2019-01-01 PROCEDURE — 96375 TX/PRO/DX INJ NEW DRUG ADDON: CPT

## 2019-01-01 PROCEDURE — 97166 OT EVAL MOD COMPLEX 45 MIN: CPT

## 2019-01-01 PROCEDURE — 97162 PT EVAL MOD COMPLEX 30 MIN: CPT

## 2019-01-01 PROCEDURE — 2500000003 HC RX 250 WO HCPCS

## 2019-01-01 PROCEDURE — 51702 INSERT TEMP BLADDER CATH: CPT

## 2019-01-01 PROCEDURE — 71046 X-RAY EXAM CHEST 2 VIEWS: CPT

## 2019-01-01 PROCEDURE — 97535 SELF CARE MNGMENT TRAINING: CPT

## 2019-01-01 PROCEDURE — 99214 OFFICE O/P EST MOD 30 MIN: CPT | Performed by: INTERNAL MEDICINE

## 2019-01-01 PROCEDURE — 85610 PROTHROMBIN TIME: CPT

## 2019-01-01 PROCEDURE — 93010 ELECTROCARDIOGRAM REPORT: CPT | Performed by: INTERNAL MEDICINE

## 2019-01-01 PROCEDURE — 82570 ASSAY OF URINE CREATININE: CPT

## 2019-01-01 PROCEDURE — 87205 SMEAR GRAM STAIN: CPT

## 2019-01-01 RX ORDER — 0.9 % SODIUM CHLORIDE 0.9 %
500 INTRAVENOUS SOLUTION INTRAVENOUS ONCE
Status: COMPLETED | OUTPATIENT
Start: 2019-01-01 | End: 2019-01-01

## 2019-01-01 RX ORDER — SODIUM CHLORIDE 0.9 % (FLUSH) 0.9 %
10 SYRINGE (ML) INJECTION PRN
Status: DISCONTINUED | OUTPATIENT
Start: 2019-01-01 | End: 2019-01-01 | Stop reason: HOSPADM

## 2019-01-01 RX ORDER — DILTIAZEM HYDROCHLORIDE 180 MG/1
180 CAPSULE, COATED, EXTENDED RELEASE ORAL DAILY
Status: DISCONTINUED | OUTPATIENT
Start: 2019-01-01 | End: 2019-01-01 | Stop reason: HOSPADM

## 2019-01-01 RX ORDER — HEPARIN SODIUM 1000 [USP'U]/ML
60 INJECTION, SOLUTION INTRAVENOUS; SUBCUTANEOUS PRN
Status: DISCONTINUED | OUTPATIENT
Start: 2019-01-01 | End: 2019-01-01

## 2019-01-01 RX ORDER — FUROSEMIDE 10 MG/ML
40 INJECTION INTRAMUSCULAR; INTRAVENOUS ONCE
Status: COMPLETED | OUTPATIENT
Start: 2019-01-01 | End: 2019-01-01

## 2019-01-01 RX ORDER — ROPINIROLE 0.25 MG/1
0.5 TABLET, FILM COATED ORAL NIGHTLY
Status: DISCONTINUED | OUTPATIENT
Start: 2019-01-01 | End: 2019-01-01 | Stop reason: HOSPADM

## 2019-01-01 RX ORDER — FUROSEMIDE 10 MG/ML
20 INJECTION INTRAMUSCULAR; INTRAVENOUS 2 TIMES DAILY
Status: DISCONTINUED | OUTPATIENT
Start: 2019-01-01 | End: 2019-01-01 | Stop reason: HOSPADM

## 2019-01-01 RX ORDER — FERROUS SULFATE 325(65) MG
325 TABLET ORAL 2 TIMES DAILY
Status: DISCONTINUED | OUTPATIENT
Start: 2019-01-01 | End: 2019-01-01 | Stop reason: HOSPADM

## 2019-01-01 RX ORDER — PRAVASTATIN SODIUM 40 MG
40 TABLET ORAL DAILY
Status: DISCONTINUED | OUTPATIENT
Start: 2019-01-01 | End: 2019-01-01 | Stop reason: HOSPADM

## 2019-01-01 RX ORDER — AZITHROMYCIN 500 MG/1
500 INJECTION, POWDER, LYOPHILIZED, FOR SOLUTION INTRAVENOUS ONCE
Status: DISCONTINUED | OUTPATIENT
Start: 2019-01-01 | End: 2019-01-01 | Stop reason: SDUPTHER

## 2019-01-01 RX ORDER — SODIUM CHLORIDE 9 MG/ML
INJECTION, SOLUTION INTRAVENOUS
Status: COMPLETED
Start: 2019-01-01 | End: 2019-01-01

## 2019-01-01 RX ORDER — HEPARIN SODIUM 10000 [USP'U]/100ML
8 INJECTION, SOLUTION INTRAVENOUS CONTINUOUS
Status: DISCONTINUED | OUTPATIENT
Start: 2019-01-01 | End: 2019-01-01 | Stop reason: HOSPADM

## 2019-01-01 RX ORDER — ASPIRIN 81 MG/1
81 TABLET ORAL DAILY
Status: DISCONTINUED | OUTPATIENT
Start: 2019-01-01 | End: 2019-01-01

## 2019-01-01 RX ORDER — SODIUM CHLORIDE 9 MG/ML
INJECTION, SOLUTION INTRAVENOUS CONTINUOUS
Status: DISCONTINUED | OUTPATIENT
Start: 2019-01-01 | End: 2019-01-01

## 2019-01-01 RX ORDER — ALPRAZOLAM 0.25 MG/1
0.25 TABLET ORAL 2 TIMES DAILY
Status: DISCONTINUED | OUTPATIENT
Start: 2019-01-01 | End: 2019-01-01 | Stop reason: HOSPADM

## 2019-01-01 RX ORDER — DILTIAZEM HYDROCHLORIDE 5 MG/ML
INJECTION INTRAVENOUS
Status: COMPLETED
Start: 2019-01-01 | End: 2019-01-01

## 2019-01-01 RX ORDER — ONDANSETRON 2 MG/ML
4 INJECTION INTRAMUSCULAR; INTRAVENOUS EVERY 6 HOURS PRN
Status: DISCONTINUED | OUTPATIENT
Start: 2019-01-01 | End: 2019-01-01 | Stop reason: HOSPADM

## 2019-01-01 RX ORDER — POLYETHYLENE GLYCOL 3350 17 G/17G
17 POWDER, FOR SOLUTION ORAL DAILY
Status: DISCONTINUED | OUTPATIENT
Start: 2019-01-01 | End: 2019-01-01 | Stop reason: HOSPADM

## 2019-01-01 RX ORDER — ASPIRIN 81 MG/1
324 TABLET, CHEWABLE ORAL DAILY
Status: DISCONTINUED | OUTPATIENT
Start: 2019-01-01 | End: 2019-01-01 | Stop reason: HOSPADM

## 2019-01-01 RX ORDER — LORAZEPAM 2 MG/ML
1 CONCENTRATE ORAL EVERY 8 HOURS PRN
Qty: 30 ML | Refills: 0 | Status: SHIPPED | OUTPATIENT
Start: 2019-01-01 | End: 2019-06-13

## 2019-01-01 RX ORDER — DEXTROSE MONOHYDRATE 25 G/50ML
12.5 INJECTION, SOLUTION INTRAVENOUS PRN
Status: DISCONTINUED | OUTPATIENT
Start: 2019-01-01 | End: 2019-01-01 | Stop reason: HOSPADM

## 2019-01-01 RX ORDER — FUROSEMIDE 20 MG/1
20 TABLET ORAL EVERY OTHER DAY
Status: DISCONTINUED | OUTPATIENT
Start: 2019-01-01 | End: 2019-01-01

## 2019-01-01 RX ORDER — FUROSEMIDE 10 MG/ML
20 INJECTION INTRAMUSCULAR; INTRAVENOUS 2 TIMES DAILY
Status: COMPLETED | OUTPATIENT
Start: 2019-01-01 | End: 2019-01-01

## 2019-01-01 RX ORDER — ACETAMINOPHEN 325 MG/1
650 TABLET ORAL EVERY 4 HOURS PRN
Status: DISCONTINUED | OUTPATIENT
Start: 2019-01-01 | End: 2019-01-01 | Stop reason: HOSPADM

## 2019-01-01 RX ORDER — SODIUM CHLORIDE 0.9 % (FLUSH) 0.9 %
10 SYRINGE (ML) INJECTION EVERY 12 HOURS SCHEDULED
Status: DISCONTINUED | OUTPATIENT
Start: 2019-01-01 | End: 2019-01-01 | Stop reason: HOSPADM

## 2019-01-01 RX ORDER — DONEPEZIL HYDROCHLORIDE 5 MG/1
10 TABLET, FILM COATED ORAL DAILY
Status: DISCONTINUED | OUTPATIENT
Start: 2019-01-01 | End: 2019-01-01 | Stop reason: HOSPADM

## 2019-01-01 RX ORDER — PREDNISONE 20 MG/1
20 TABLET ORAL DAILY
Status: COMPLETED | OUTPATIENT
Start: 2019-01-01 | End: 2019-01-01

## 2019-01-01 RX ORDER — IPRATROPIUM BROMIDE AND ALBUTEROL SULFATE 2.5; .5 MG/3ML; MG/3ML
1 SOLUTION RESPIRATORY (INHALATION) EVERY 4 HOURS PRN
Status: DISCONTINUED | OUTPATIENT
Start: 2019-01-01 | End: 2019-01-01 | Stop reason: HOSPADM

## 2019-01-01 RX ORDER — NICOTINE POLACRILEX 4 MG
15 LOZENGE BUCCAL PRN
Status: DISCONTINUED | OUTPATIENT
Start: 2019-01-01 | End: 2019-01-01 | Stop reason: HOSPADM

## 2019-01-01 RX ORDER — DILTIAZEM HYDROCHLORIDE 5 MG/ML
10 INJECTION INTRAVENOUS ONCE
Status: DISCONTINUED | OUTPATIENT
Start: 2019-01-01 | End: 2019-01-01 | Stop reason: SDUPTHER

## 2019-01-01 RX ORDER — MORPHINE SULFATE 100 MG/5ML
10 SOLUTION ORAL
Qty: 15 ML | Refills: 0 | Status: SHIPPED | OUTPATIENT
Start: 2019-01-01 | End: 2019-01-01

## 2019-01-01 RX ORDER — HEPARIN SODIUM 1000 [USP'U]/ML
60 INJECTION, SOLUTION INTRAVENOUS; SUBCUTANEOUS ONCE
Status: COMPLETED | OUTPATIENT
Start: 2019-01-01 | End: 2019-01-01

## 2019-01-01 RX ORDER — DILTIAZEM HYDROCHLORIDE 180 MG/1
180 CAPSULE, COATED, EXTENDED RELEASE ORAL DAILY
Qty: 30 CAPSULE | Refills: 3
Start: 2019-01-01

## 2019-01-01 RX ORDER — REPAGLINIDE 1 MG/1
1 TABLET ORAL
Status: DISCONTINUED | OUTPATIENT
Start: 2019-01-01 | End: 2019-01-01 | Stop reason: HOSPADM

## 2019-01-01 RX ORDER — SODIUM CHLORIDE 0.9 % (FLUSH) 0.9 %
10 SYRINGE (ML) INJECTION EVERY 12 HOURS SCHEDULED
Status: DISCONTINUED | OUTPATIENT
Start: 2019-01-01 | End: 2019-01-01 | Stop reason: SDUPTHER

## 2019-01-01 RX ORDER — SODIUM CHLORIDE 0.9 % (FLUSH) 0.9 %
10 SYRINGE (ML) INJECTION PRN
Status: DISCONTINUED | OUTPATIENT
Start: 2019-01-01 | End: 2019-01-01 | Stop reason: SDUPTHER

## 2019-01-01 RX ORDER — FUROSEMIDE 10 MG/ML
20 INJECTION INTRAMUSCULAR; INTRAVENOUS ONCE
Status: COMPLETED | OUTPATIENT
Start: 2019-01-01 | End: 2019-01-01

## 2019-01-01 RX ORDER — DEXTROSE MONOHYDRATE 50 MG/ML
100 INJECTION, SOLUTION INTRAVENOUS PRN
Status: DISCONTINUED | OUTPATIENT
Start: 2019-01-01 | End: 2019-01-01 | Stop reason: HOSPADM

## 2019-01-01 RX ORDER — IPRATROPIUM BROMIDE AND ALBUTEROL SULFATE 2.5; .5 MG/3ML; MG/3ML
1 SOLUTION RESPIRATORY (INHALATION)
Status: DISCONTINUED | OUTPATIENT
Start: 2019-01-01 | End: 2019-01-01 | Stop reason: HOSPADM

## 2019-01-01 RX ORDER — LIDOCAINE HYDROCHLORIDE 10 MG/ML
5 INJECTION, SOLUTION EPIDURAL; INFILTRATION; INTRACAUDAL; PERINEURAL ONCE
Status: DISCONTINUED | OUTPATIENT
Start: 2019-01-01 | End: 2019-01-01 | Stop reason: HOSPADM

## 2019-01-01 RX ORDER — DILTIAZEM HYDROCHLORIDE 5 MG/ML
10 INJECTION INTRAVENOUS ONCE
Status: COMPLETED | OUTPATIENT
Start: 2019-01-01 | End: 2019-01-01

## 2019-01-01 RX ORDER — HEPARIN SODIUM 1000 [USP'U]/ML
30 INJECTION, SOLUTION INTRAVENOUS; SUBCUTANEOUS PRN
Status: DISCONTINUED | OUTPATIENT
Start: 2019-01-01 | End: 2019-01-01

## 2019-01-01 RX ORDER — LEVALBUTEROL INHALATION SOLUTION 0.63 MG/3ML
1.26 SOLUTION RESPIRATORY (INHALATION) ONCE
Status: COMPLETED | OUTPATIENT
Start: 2019-01-01 | End: 2019-01-01

## 2019-01-01 RX ORDER — DILTIAZEM HYDROCHLORIDE 5 MG/ML
10 INJECTION INTRAVENOUS ONCE
Status: DISCONTINUED | OUTPATIENT
Start: 2019-01-01 | End: 2019-01-01

## 2019-01-01 RX ORDER — DOCUSATE SODIUM 100 MG/1
100 CAPSULE, LIQUID FILLED ORAL DAILY
Status: ON HOLD | COMMUNITY
End: 2019-01-01 | Stop reason: HOSPADM

## 2019-01-01 RX ADMIN — FUROSEMIDE 20 MG: 10 INJECTION, SOLUTION INTRAMUSCULAR; INTRAVENOUS at 14:05

## 2019-01-01 RX ADMIN — IPRATROPIUM BROMIDE AND ALBUTEROL SULFATE 1 AMPULE: .5; 3 SOLUTION RESPIRATORY (INHALATION) at 07:24

## 2019-01-01 RX ADMIN — ALPRAZOLAM 0.25 MG: 0.25 TABLET ORAL at 09:19

## 2019-01-01 RX ADMIN — ALPRAZOLAM 0.25 MG: 0.25 TABLET ORAL at 21:21

## 2019-01-01 RX ADMIN — REPAGLINIDE 1 MG: 1 TABLET ORAL at 11:57

## 2019-01-01 RX ADMIN — DILTIAZEM HYDROCHLORIDE 10 MG/HR: 5 INJECTION INTRAVENOUS at 01:33

## 2019-01-01 RX ADMIN — Medication 10 ML: at 21:21

## 2019-01-01 RX ADMIN — Medication 10 ML: at 22:11

## 2019-01-01 RX ADMIN — Medication 2 PUFF: at 20:03

## 2019-01-01 RX ADMIN — POLYETHYLENE GLYCOL 3350 17 G: 17 POWDER, FOR SOLUTION ORAL at 08:55

## 2019-01-01 RX ADMIN — ALPRAZOLAM 0.25 MG: 0.25 TABLET ORAL at 20:29

## 2019-01-01 RX ADMIN — REPAGLINIDE 1 MG: 1 TABLET ORAL at 12:04

## 2019-01-01 RX ADMIN — IPRATROPIUM BROMIDE AND ALBUTEROL SULFATE 1 AMPULE: .5; 3 SOLUTION RESPIRATORY (INHALATION) at 20:03

## 2019-01-01 RX ADMIN — Medication 10 ML: at 09:41

## 2019-01-01 RX ADMIN — DILTIAZEM HYDROCHLORIDE 180 MG: 180 CAPSULE, COATED, EXTENDED RELEASE ORAL at 09:40

## 2019-01-01 RX ADMIN — ASPIRIN 81 MG: 81 TABLET, COATED ORAL at 12:25

## 2019-01-01 RX ADMIN — PREDNISONE 20 MG: 20 TABLET ORAL at 20:03

## 2019-01-01 RX ADMIN — INSULIN LISPRO 4 UNITS: 100 INJECTION, SOLUTION INTRAVENOUS; SUBCUTANEOUS at 21:36

## 2019-01-01 RX ADMIN — METOPROLOL TARTRATE 25 MG: 25 TABLET, FILM COATED ORAL at 20:06

## 2019-01-01 RX ADMIN — Medication 10 ML: at 19:56

## 2019-01-01 RX ADMIN — Medication 10 ML: at 22:31

## 2019-01-01 RX ADMIN — FERROUS SULFATE TAB 325 MG (65 MG ELEMENTAL FE) 325 MG: 325 (65 FE) TAB at 21:21

## 2019-01-01 RX ADMIN — FERROUS SULFATE TAB 325 MG (65 MG ELEMENTAL FE) 325 MG: 325 (65 FE) TAB at 20:06

## 2019-01-01 RX ADMIN — INSULIN LISPRO 2 UNITS: 100 INJECTION, SOLUTION INTRAVENOUS; SUBCUTANEOUS at 02:14

## 2019-01-01 RX ADMIN — ALPRAZOLAM 0.25 MG: 0.25 TABLET ORAL at 20:59

## 2019-01-01 RX ADMIN — ASPIRIN 81 MG 324 MG: 81 TABLET ORAL at 10:25

## 2019-01-01 RX ADMIN — DONEPEZIL HYDROCHLORIDE 10 MG: 5 TABLET, FILM COATED ORAL at 09:20

## 2019-01-01 RX ADMIN — IPRATROPIUM BROMIDE AND ALBUTEROL SULFATE 1 AMPULE: .5; 3 SOLUTION RESPIRATORY (INHALATION) at 21:06

## 2019-01-01 RX ADMIN — ANORECTAL OINTMENT: 15.7; .44; 24; 20.6 OINTMENT TOPICAL at 09:29

## 2019-01-01 RX ADMIN — PREDNISONE 20 MG: 20 TABLET ORAL at 21:01

## 2019-01-01 RX ADMIN — INSULIN GLARGINE 5 UNITS: 100 INJECTION, SOLUTION SUBCUTANEOUS at 10:27

## 2019-01-01 RX ADMIN — LINAGLIPTIN 5 MG: 5 TABLET, FILM COATED ORAL at 10:26

## 2019-01-01 RX ADMIN — DONEPEZIL HYDROCHLORIDE 10 MG: 5 TABLET, FILM COATED ORAL at 08:33

## 2019-01-01 RX ADMIN — FERROUS SULFATE TAB 325 MG (65 MG ELEMENTAL FE) 325 MG: 325 (65 FE) TAB at 21:36

## 2019-01-01 RX ADMIN — DONEPEZIL HYDROCHLORIDE 10 MG: 5 TABLET, FILM COATED ORAL at 10:26

## 2019-01-01 RX ADMIN — IPRATROPIUM BROMIDE AND ALBUTEROL SULFATE 1 AMPULE: .5; 3 SOLUTION RESPIRATORY (INHALATION) at 16:50

## 2019-01-01 RX ADMIN — ASPIRIN 81 MG 324 MG: 81 TABLET ORAL at 09:23

## 2019-01-01 RX ADMIN — INSULIN LISPRO 10 UNITS: 100 INJECTION, SOLUTION INTRAVENOUS; SUBCUTANEOUS at 12:02

## 2019-01-01 RX ADMIN — DILTIAZEM HYDROCHLORIDE 10 MG/HR: 5 INJECTION INTRAVENOUS at 13:27

## 2019-01-01 RX ADMIN — Medication 2 PUFF: at 07:34

## 2019-01-01 RX ADMIN — IPRATROPIUM BROMIDE AND ALBUTEROL SULFATE 1 AMPULE: .5; 3 SOLUTION RESPIRATORY (INHALATION) at 21:11

## 2019-01-01 RX ADMIN — INSULIN LISPRO 12 UNITS: 100 INJECTION, SOLUTION INTRAVENOUS; SUBCUTANEOUS at 12:03

## 2019-01-01 RX ADMIN — Medication 10 ML: at 21:36

## 2019-01-01 RX ADMIN — IPRATROPIUM BROMIDE AND ALBUTEROL SULFATE 1 AMPULE: .5; 3 SOLUTION RESPIRATORY (INHALATION) at 10:53

## 2019-01-01 RX ADMIN — ROPINIROLE HYDROCHLORIDE 0.5 MG: 0.25 TABLET, FILM COATED ORAL at 19:56

## 2019-01-01 RX ADMIN — IPRATROPIUM BROMIDE AND ALBUTEROL SULFATE 1 AMPULE: .5; 3 SOLUTION RESPIRATORY (INHALATION) at 12:41

## 2019-01-01 RX ADMIN — INSULIN GLARGINE 5 UNITS: 100 INJECTION, SOLUTION SUBCUTANEOUS at 09:41

## 2019-01-01 RX ADMIN — DILTIAZEM HYDROCHLORIDE 180 MG: 180 CAPSULE, COATED, EXTENDED RELEASE ORAL at 08:53

## 2019-01-01 RX ADMIN — FERROUS SULFATE TAB 325 MG (65 MG ELEMENTAL FE) 325 MG: 325 (65 FE) TAB at 21:00

## 2019-01-01 RX ADMIN — FUROSEMIDE 40 MG: 10 INJECTION, SOLUTION INTRAMUSCULAR; INTRAVENOUS at 18:51

## 2019-01-01 RX ADMIN — INSULIN LISPRO 2 UNITS: 100 INJECTION, SOLUTION INTRAVENOUS; SUBCUTANEOUS at 16:26

## 2019-01-01 RX ADMIN — SODIUM CHLORIDE 1000 ML: 9 INJECTION, SOLUTION INTRAVENOUS at 20:01

## 2019-01-01 RX ADMIN — IPRATROPIUM BROMIDE AND ALBUTEROL SULFATE 1 AMPULE: .5; 3 SOLUTION RESPIRATORY (INHALATION) at 02:33

## 2019-01-01 RX ADMIN — Medication 10 ML: at 20:00

## 2019-01-01 RX ADMIN — FUROSEMIDE 20 MG: 10 INJECTION, SOLUTION INTRAMUSCULAR; INTRAVENOUS at 10:26

## 2019-01-01 RX ADMIN — ASPIRIN 81 MG: 81 TABLET, COATED ORAL at 09:20

## 2019-01-01 RX ADMIN — REPAGLINIDE 1 MG: 1 TABLET ORAL at 12:30

## 2019-01-01 RX ADMIN — INSULIN GLARGINE 5 UNITS: 100 INJECTION, SOLUTION SUBCUTANEOUS at 09:30

## 2019-01-01 RX ADMIN — ALPRAZOLAM 0.25 MG: 0.25 TABLET ORAL at 19:59

## 2019-01-01 RX ADMIN — LINAGLIPTIN 5 MG: 5 TABLET, FILM COATED ORAL at 09:23

## 2019-01-01 RX ADMIN — HEPARIN SODIUM 20 UNITS/KG/HR: 10000 INJECTION, SOLUTION INTRAVENOUS at 22:08

## 2019-01-01 RX ADMIN — REPAGLINIDE 1 MG: 1 TABLET ORAL at 17:18

## 2019-01-01 RX ADMIN — IPRATROPIUM BROMIDE AND ALBUTEROL SULFATE 1 AMPULE: .5; 3 SOLUTION RESPIRATORY (INHALATION) at 20:45

## 2019-01-01 RX ADMIN — INSULIN LISPRO 1 UNITS: 100 INJECTION, SOLUTION INTRAVENOUS; SUBCUTANEOUS at 20:52

## 2019-01-01 RX ADMIN — INSULIN LISPRO 1 UNITS: 100 INJECTION, SOLUTION INTRAVENOUS; SUBCUTANEOUS at 20:32

## 2019-01-01 RX ADMIN — IPRATROPIUM BROMIDE AND ALBUTEROL SULFATE 1 AMPULE: .5; 3 SOLUTION RESPIRATORY (INHALATION) at 12:46

## 2019-01-01 RX ADMIN — SODIUM CHLORIDE: 9 INJECTION, SOLUTION INTRAVENOUS at 02:11

## 2019-01-01 RX ADMIN — INSULIN LISPRO 4 UNITS: 100 INJECTION, SOLUTION INTRAVENOUS; SUBCUTANEOUS at 10:26

## 2019-01-01 RX ADMIN — FERROUS SULFATE TAB 325 MG (65 MG ELEMENTAL FE) 325 MG: 325 (65 FE) TAB at 08:33

## 2019-01-01 RX ADMIN — FUROSEMIDE 20 MG: 10 INJECTION, SOLUTION INTRAMUSCULAR; INTRAVENOUS at 12:31

## 2019-01-01 RX ADMIN — CEFTRIAXONE 2 G: 2 INJECTION, POWDER, FOR SOLUTION INTRAMUSCULAR; INTRAVENOUS at 09:40

## 2019-01-01 RX ADMIN — METOPROLOL TARTRATE 25 MG: 25 TABLET, FILM COATED ORAL at 21:21

## 2019-01-01 RX ADMIN — ROPINIROLE HYDROCHLORIDE 0.5 MG: 0.25 TABLET, FILM COATED ORAL at 20:29

## 2019-01-01 RX ADMIN — DILTIAZEM HYDROCHLORIDE 180 MG: 180 CAPSULE, COATED, EXTENDED RELEASE ORAL at 09:20

## 2019-01-01 RX ADMIN — AZITHROMYCIN MONOHYDRATE 250 MG: 500 INJECTION, POWDER, LYOPHILIZED, FOR SOLUTION INTRAVENOUS at 20:07

## 2019-01-01 RX ADMIN — DONEPEZIL HYDROCHLORIDE 10 MG: 5 TABLET, FILM COATED ORAL at 09:36

## 2019-01-01 RX ADMIN — FUROSEMIDE 40 MG: 10 INJECTION, SOLUTION INTRAMUSCULAR; INTRAVENOUS at 22:10

## 2019-01-01 RX ADMIN — ROPINIROLE HYDROCHLORIDE 0.5 MG: 0.25 TABLET, FILM COATED ORAL at 20:45

## 2019-01-01 RX ADMIN — HEPARIN SODIUM 22 UNITS/KG/HR: 10000 INJECTION, SOLUTION INTRAVENOUS at 18:23

## 2019-01-01 RX ADMIN — Medication 10 ML: at 20:38

## 2019-01-01 RX ADMIN — PRAVASTATIN SODIUM 40 MG: 40 TABLET ORAL at 08:53

## 2019-01-01 RX ADMIN — ACETAMINOPHEN 650 MG: 325 TABLET, FILM COATED ORAL at 04:24

## 2019-01-01 RX ADMIN — METOPROLOL TARTRATE 25 MG: 25 TABLET, FILM COATED ORAL at 09:39

## 2019-01-01 RX ADMIN — ROPINIROLE HYDROCHLORIDE 0.5 MG: 0.25 TABLET, FILM COATED ORAL at 20:59

## 2019-01-01 RX ADMIN — INSULIN LISPRO 8 UNITS: 100 INJECTION, SOLUTION INTRAVENOUS; SUBCUTANEOUS at 11:37

## 2019-01-01 RX ADMIN — ALPRAZOLAM 0.25 MG: 0.25 TABLET ORAL at 09:39

## 2019-01-01 RX ADMIN — ASPIRIN 81 MG 324 MG: 81 TABLET ORAL at 08:55

## 2019-01-01 RX ADMIN — ALPRAZOLAM 0.25 MG: 0.25 TABLET ORAL at 19:55

## 2019-01-01 RX ADMIN — ROPINIROLE HYDROCHLORIDE 0.5 MG: 0.25 TABLET, FILM COATED ORAL at 21:21

## 2019-01-01 RX ADMIN — INSULIN LISPRO 6 UNITS: 100 INJECTION, SOLUTION INTRAVENOUS; SUBCUTANEOUS at 11:40

## 2019-01-01 RX ADMIN — FERROUS SULFATE TAB 325 MG (65 MG ELEMENTAL FE) 325 MG: 325 (65 FE) TAB at 19:55

## 2019-01-01 RX ADMIN — Medication 2 PUFF: at 07:30

## 2019-01-01 RX ADMIN — Medication 10 ML: at 08:34

## 2019-01-01 RX ADMIN — DILTIAZEM HYDROCHLORIDE 10 MG: 5 INJECTION INTRAVENOUS at 18:32

## 2019-01-01 RX ADMIN — Medication 2 PUFF: at 09:29

## 2019-01-01 RX ADMIN — INSULIN LISPRO 2 UNITS: 100 INJECTION, SOLUTION INTRAVENOUS; SUBCUTANEOUS at 16:11

## 2019-01-01 RX ADMIN — Medication 2 PUFF: at 07:36

## 2019-01-01 RX ADMIN — CEFTRIAXONE 2 G: 2 INJECTION, POWDER, FOR SOLUTION INTRAMUSCULAR; INTRAVENOUS at 09:24

## 2019-01-01 RX ADMIN — INSULIN GLARGINE 5 UNITS: 100 INJECTION, SOLUTION SUBCUTANEOUS at 10:20

## 2019-01-01 RX ADMIN — HEPARIN SODIUM 18 UNITS/KG/HR: 10000 INJECTION, SOLUTION INTRAVENOUS at 14:47

## 2019-01-01 RX ADMIN — SODIUM CHLORIDE 1000 ML: 9 INJECTION, SOLUTION INTRAVENOUS at 20:07

## 2019-01-01 RX ADMIN — INSULIN LISPRO 1 UNITS: 100 INJECTION, SOLUTION INTRAVENOUS; SUBCUTANEOUS at 20:19

## 2019-01-01 RX ADMIN — INSULIN LISPRO 4 UNITS: 100 INJECTION, SOLUTION INTRAVENOUS; SUBCUTANEOUS at 02:26

## 2019-01-01 RX ADMIN — PRAVASTATIN SODIUM 40 MG: 40 TABLET ORAL at 08:33

## 2019-01-01 RX ADMIN — HEPARIN SODIUM 1420 UNITS: 1000 INJECTION, SOLUTION INTRAVENOUS; SUBCUTANEOUS at 02:58

## 2019-01-01 RX ADMIN — DONEPEZIL HYDROCHLORIDE 10 MG: 5 TABLET, FILM COATED ORAL at 12:24

## 2019-01-01 RX ADMIN — METOPROLOL TARTRATE 25 MG: 25 TABLET, FILM COATED ORAL at 21:35

## 2019-01-01 RX ADMIN — PRAVASTATIN SODIUM 40 MG: 40 TABLET ORAL at 09:36

## 2019-01-01 RX ADMIN — ALPRAZOLAM 0.25 MG: 0.25 TABLET ORAL at 08:55

## 2019-01-01 RX ADMIN — METOPROLOL TARTRATE 25 MG: 25 TABLET, FILM COATED ORAL at 10:25

## 2019-01-01 RX ADMIN — FERROUS SULFATE TAB 325 MG (65 MG ELEMENTAL FE) 325 MG: 325 (65 FE) TAB at 09:40

## 2019-01-01 RX ADMIN — PRAVASTATIN SODIUM 40 MG: 40 TABLET ORAL at 12:25

## 2019-01-01 RX ADMIN — FUROSEMIDE 20 MG: 20 TABLET ORAL at 09:36

## 2019-01-01 RX ADMIN — FERROUS SULFATE TAB 325 MG (65 MG ELEMENTAL FE) 325 MG: 325 (65 FE) TAB at 09:20

## 2019-01-01 RX ADMIN — IPRATROPIUM BROMIDE AND ALBUTEROL SULFATE 1 AMPULE: .5; 3 SOLUTION RESPIRATORY (INHALATION) at 15:01

## 2019-01-01 RX ADMIN — DILTIAZEM HYDROCHLORIDE 180 MG: 180 CAPSULE, COATED, EXTENDED RELEASE ORAL at 09:23

## 2019-01-01 RX ADMIN — ALPRAZOLAM 0.25 MG: 0.25 TABLET ORAL at 21:36

## 2019-01-01 RX ADMIN — REPAGLINIDE 1 MG: 1 TABLET ORAL at 09:20

## 2019-01-01 RX ADMIN — FERROUS SULFATE TAB 325 MG (65 MG ELEMENTAL FE) 325 MG: 325 (65 FE) TAB at 09:23

## 2019-01-01 RX ADMIN — INSULIN GLARGINE 5 UNITS: 100 INJECTION, SOLUTION SUBCUTANEOUS at 08:46

## 2019-01-01 RX ADMIN — AZITHROMYCIN MONOHYDRATE 250 MG: 500 INJECTION, POWDER, LYOPHILIZED, FOR SOLUTION INTRAVENOUS at 20:05

## 2019-01-01 RX ADMIN — PREDNISONE 20 MG: 20 TABLET ORAL at 21:21

## 2019-01-01 RX ADMIN — METOPROLOL TARTRATE 25 MG: 25 TABLET, FILM COATED ORAL at 12:24

## 2019-01-01 RX ADMIN — ALPRAZOLAM 0.25 MG: 0.25 TABLET ORAL at 07:01

## 2019-01-01 RX ADMIN — INSULIN LISPRO 2 UNITS: 100 INJECTION, SOLUTION INTRAVENOUS; SUBCUTANEOUS at 09:31

## 2019-01-01 RX ADMIN — FERROUS SULFATE TAB 325 MG (65 MG ELEMENTAL FE) 325 MG: 325 (65 FE) TAB at 20:45

## 2019-01-01 RX ADMIN — DILTIAZEM HYDROCHLORIDE 5 MG/HR: 5 INJECTION INTRAVENOUS at 18:33

## 2019-01-01 RX ADMIN — ACETAMINOPHEN 650 MG: 325 TABLET, FILM COATED ORAL at 22:47

## 2019-01-01 RX ADMIN — METOPROLOL TARTRATE 25 MG: 25 TABLET, FILM COATED ORAL at 20:28

## 2019-01-01 RX ADMIN — METOPROLOL TARTRATE 25 MG: 25 TABLET, FILM COATED ORAL at 10:20

## 2019-01-01 RX ADMIN — REPAGLINIDE 1 MG: 1 TABLET ORAL at 10:28

## 2019-01-01 RX ADMIN — Medication 10 ML: at 09:20

## 2019-01-01 RX ADMIN — ALPRAZOLAM 0.25 MG: 0.25 TABLET ORAL at 20:45

## 2019-01-01 RX ADMIN — Medication 10 ML: at 20:06

## 2019-01-01 RX ADMIN — IPRATROPIUM BROMIDE AND ALBUTEROL SULFATE 1 AMPULE: .5; 3 SOLUTION RESPIRATORY (INHALATION) at 07:34

## 2019-01-01 RX ADMIN — HEPARIN SODIUM 2830 UNITS: 1000 INJECTION, SOLUTION INTRAVENOUS; SUBCUTANEOUS at 20:17

## 2019-01-01 RX ADMIN — Medication 10 ML: at 02:24

## 2019-01-01 RX ADMIN — IPRATROPIUM BROMIDE AND ALBUTEROL SULFATE 1 AMPULE: .5; 3 SOLUTION RESPIRATORY (INHALATION) at 16:38

## 2019-01-01 RX ADMIN — PREDNISONE 20 MG: 20 TABLET ORAL at 19:55

## 2019-01-01 RX ADMIN — FUROSEMIDE 20 MG: 10 INJECTION, SOLUTION INTRAMUSCULAR; INTRAVENOUS at 17:59

## 2019-01-01 RX ADMIN — FUROSEMIDE 20 MG: 10 INJECTION, SOLUTION INTRAMUSCULAR; INTRAVENOUS at 18:54

## 2019-01-01 RX ADMIN — HEPARIN SODIUM 2830 UNITS: 1000 INJECTION, SOLUTION INTRAVENOUS; SUBCUTANEOUS at 11:56

## 2019-01-01 RX ADMIN — Medication 2 PUFF: at 20:45

## 2019-01-01 RX ADMIN — ROPINIROLE HYDROCHLORIDE 0.5 MG: 0.25 TABLET, FILM COATED ORAL at 21:35

## 2019-01-01 RX ADMIN — INSULIN LISPRO 4 UNITS: 100 INJECTION, SOLUTION INTRAVENOUS; SUBCUTANEOUS at 13:21

## 2019-01-01 RX ADMIN — ALPRAZOLAM 0.25 MG: 0.25 TABLET ORAL at 20:06

## 2019-01-01 RX ADMIN — SODIUM CHLORIDE 500 ML: 9 INJECTION, SOLUTION INTRAVENOUS at 16:00

## 2019-01-01 RX ADMIN — METOPROLOL TARTRATE 25 MG: 25 TABLET, FILM COATED ORAL at 21:00

## 2019-01-01 RX ADMIN — HEPARIN SODIUM 20 UNITS/KG/HR: 10000 INJECTION, SOLUTION INTRAVENOUS at 11:11

## 2019-01-01 RX ADMIN — HEPARIN SODIUM 1420 UNITS: 1000 INJECTION, SOLUTION INTRAVENOUS; SUBCUTANEOUS at 18:22

## 2019-01-01 RX ADMIN — Medication 2 PUFF: at 11:56

## 2019-01-01 RX ADMIN — CEFTRIAXONE 2 G: 2 INJECTION, POWDER, FOR SOLUTION INTRAMUSCULAR; INTRAVENOUS at 10:03

## 2019-01-01 RX ADMIN — PREDNISONE 20 MG: 20 TABLET ORAL at 20:06

## 2019-01-01 RX ADMIN — INSULIN LISPRO 4 UNITS: 100 INJECTION, SOLUTION INTRAVENOUS; SUBCUTANEOUS at 16:46

## 2019-01-01 RX ADMIN — INSULIN LISPRO 6 UNITS: 100 INJECTION, SOLUTION INTRAVENOUS; SUBCUTANEOUS at 10:04

## 2019-01-01 RX ADMIN — INSULIN LISPRO 6 UNITS: 100 INJECTION, SOLUTION INTRAVENOUS; SUBCUTANEOUS at 12:58

## 2019-01-01 RX ADMIN — Medication 10 ML: at 20:01

## 2019-01-01 RX ADMIN — INSULIN LISPRO 1 UNITS: 100 INJECTION, SOLUTION INTRAVENOUS; SUBCUTANEOUS at 20:15

## 2019-01-01 RX ADMIN — INSULIN LISPRO 4 UNITS: 100 INJECTION, SOLUTION INTRAVENOUS; SUBCUTANEOUS at 17:57

## 2019-01-01 RX ADMIN — REPAGLINIDE 1 MG: 1 TABLET ORAL at 16:44

## 2019-01-01 RX ADMIN — Medication 10 ML: at 09:24

## 2019-01-01 RX ADMIN — Medication 2 PUFF: at 07:24

## 2019-01-01 RX ADMIN — Medication 1 G: at 20:00

## 2019-01-01 RX ADMIN — PRAVASTATIN SODIUM 40 MG: 40 TABLET ORAL at 09:39

## 2019-01-01 RX ADMIN — IPRATROPIUM BROMIDE AND ALBUTEROL SULFATE 1 AMPULE: .5; 3 SOLUTION RESPIRATORY (INHALATION) at 20:02

## 2019-01-01 RX ADMIN — REPAGLINIDE 1 MG: 1 TABLET ORAL at 12:31

## 2019-01-01 RX ADMIN — INSULIN LISPRO 1 UNITS: 100 INJECTION, SOLUTION INTRAVENOUS; SUBCUTANEOUS at 21:22

## 2019-01-01 RX ADMIN — REPAGLINIDE 1 MG: 1 TABLET ORAL at 11:39

## 2019-01-01 RX ADMIN — AZITHROMYCIN MONOHYDRATE 500 MG: 500 INJECTION, POWDER, LYOPHILIZED, FOR SOLUTION INTRAVENOUS at 19:48

## 2019-01-01 RX ADMIN — CEFTRIAXONE 2 G: 2 INJECTION, POWDER, FOR SOLUTION INTRAMUSCULAR; INTRAVENOUS at 10:24

## 2019-01-01 RX ADMIN — CEFTRIAXONE 2 G: 2 INJECTION, POWDER, FOR SOLUTION INTRAMUSCULAR; INTRAVENOUS at 08:58

## 2019-01-01 RX ADMIN — PRAVASTATIN SODIUM 40 MG: 40 TABLET ORAL at 09:23

## 2019-01-01 RX ADMIN — Medication 10 ML: at 10:25

## 2019-01-01 RX ADMIN — PRAVASTATIN SODIUM 40 MG: 40 TABLET ORAL at 10:26

## 2019-01-01 RX ADMIN — IPRATROPIUM BROMIDE AND ALBUTEROL SULFATE 1 AMPULE: .5; 3 SOLUTION RESPIRATORY (INHALATION) at 20:08

## 2019-01-01 RX ADMIN — LINAGLIPTIN 5 MG: 5 TABLET, FILM COATED ORAL at 08:54

## 2019-01-01 RX ADMIN — HEPARIN SODIUM 1420 UNITS: 1000 INJECTION, SOLUTION INTRAVENOUS; SUBCUTANEOUS at 06:54

## 2019-01-01 RX ADMIN — ANORECTAL OINTMENT: 15.7; .44; 24; 20.6 OINTMENT TOPICAL at 00:15

## 2019-01-01 RX ADMIN — HEPARIN SODIUM 1420 UNITS: 1000 INJECTION, SOLUTION INTRAVENOUS; SUBCUTANEOUS at 22:07

## 2019-01-01 RX ADMIN — INSULIN LISPRO 2 UNITS: 100 INJECTION, SOLUTION INTRAVENOUS; SUBCUTANEOUS at 17:19

## 2019-01-01 RX ADMIN — DONEPEZIL HYDROCHLORIDE 10 MG: 5 TABLET, FILM COATED ORAL at 09:40

## 2019-01-01 RX ADMIN — LINAGLIPTIN 5 MG: 5 TABLET, FILM COATED ORAL at 12:24

## 2019-01-01 RX ADMIN — INSULIN LISPRO 10 UNITS: 100 INJECTION, SOLUTION INTRAVENOUS; SUBCUTANEOUS at 09:12

## 2019-01-01 RX ADMIN — INSULIN LISPRO 2 UNITS: 100 INJECTION, SOLUTION INTRAVENOUS; SUBCUTANEOUS at 09:37

## 2019-01-01 RX ADMIN — IPRATROPIUM BROMIDE AND ALBUTEROL SULFATE 1 AMPULE: .5; 3 SOLUTION RESPIRATORY (INHALATION) at 15:46

## 2019-01-01 RX ADMIN — INSULIN LISPRO 8 UNITS: 100 INJECTION, SOLUTION INTRAVENOUS; SUBCUTANEOUS at 08:42

## 2019-01-01 RX ADMIN — IPRATROPIUM BROMIDE AND ALBUTEROL SULFATE 1 AMPULE: .5; 3 SOLUTION RESPIRATORY (INHALATION) at 16:32

## 2019-01-01 RX ADMIN — FERROUS SULFATE TAB 325 MG (65 MG ELEMENTAL FE) 325 MG: 325 (65 FE) TAB at 02:14

## 2019-01-01 RX ADMIN — ANORECTAL OINTMENT: 15.7; .44; 24; 20.6 OINTMENT TOPICAL at 20:13

## 2019-01-01 RX ADMIN — Medication: at 20:09

## 2019-01-01 RX ADMIN — HEPARIN SODIUM 12 UNITS/KG/HR: 10000 INJECTION, SOLUTION INTRAVENOUS at 11:55

## 2019-01-01 RX ADMIN — POLYETHYLENE GLYCOL 3350 17 G: 17 POWDER, FOR SOLUTION ORAL at 09:39

## 2019-01-01 RX ADMIN — ASPIRIN 81 MG: 81 TABLET, COATED ORAL at 08:34

## 2019-01-01 RX ADMIN — METOPROLOL TARTRATE 25 MG: 25 TABLET, FILM COATED ORAL at 08:55

## 2019-01-01 RX ADMIN — Medication 2 PUFF: at 19:41

## 2019-01-01 RX ADMIN — FERROUS SULFATE TAB 325 MG (65 MG ELEMENTAL FE) 325 MG: 325 (65 FE) TAB at 09:36

## 2019-01-01 RX ADMIN — IPRATROPIUM BROMIDE AND ALBUTEROL SULFATE 1 AMPULE: .5; 3 SOLUTION RESPIRATORY (INHALATION) at 11:54

## 2019-01-01 RX ADMIN — DILTIAZEM HYDROCHLORIDE 180 MG: 180 CAPSULE, COATED, EXTENDED RELEASE ORAL at 08:34

## 2019-01-01 RX ADMIN — REPAGLINIDE 1 MG: 1 TABLET ORAL at 06:32

## 2019-01-01 RX ADMIN — Medication 10 ML: at 21:01

## 2019-01-01 RX ADMIN — FERROUS SULFATE TAB 325 MG (65 MG ELEMENTAL FE) 325 MG: 325 (65 FE) TAB at 19:59

## 2019-01-01 RX ADMIN — IPRATROPIUM BROMIDE AND ALBUTEROL SULFATE 1 AMPULE: .5; 3 SOLUTION RESPIRATORY (INHALATION) at 20:50

## 2019-01-01 RX ADMIN — AZITHROMYCIN MONOHYDRATE 250 MG: 500 INJECTION, POWDER, LYOPHILIZED, FOR SOLUTION INTRAVENOUS at 20:59

## 2019-01-01 RX ADMIN — METOPROLOL TARTRATE 25 MG: 25 TABLET, FILM COATED ORAL at 22:31

## 2019-01-01 RX ADMIN — REPAGLINIDE 1 MG: 1 TABLET ORAL at 06:31

## 2019-01-01 RX ADMIN — ROPINIROLE HYDROCHLORIDE 0.5 MG: 0.25 TABLET, FILM COATED ORAL at 02:14

## 2019-01-01 RX ADMIN — DONEPEZIL HYDROCHLORIDE 10 MG: 5 TABLET, FILM COATED ORAL at 09:23

## 2019-01-01 RX ADMIN — REPAGLINIDE 1 MG: 1 TABLET ORAL at 17:56

## 2019-01-01 RX ADMIN — FERROUS SULFATE TAB 325 MG (65 MG ELEMENTAL FE) 325 MG: 325 (65 FE) TAB at 20:28

## 2019-01-01 RX ADMIN — REPAGLINIDE 1 MG: 1 TABLET ORAL at 06:24

## 2019-01-01 RX ADMIN — ALPRAZOLAM 0.25 MG: 0.25 TABLET ORAL at 09:36

## 2019-01-01 RX ADMIN — IPRATROPIUM BROMIDE AND ALBUTEROL SULFATE 1 AMPULE: .5; 3 SOLUTION RESPIRATORY (INHALATION) at 11:21

## 2019-01-01 RX ADMIN — HEPARIN SODIUM 1420 UNITS: 1000 INJECTION, SOLUTION INTRAVENOUS; SUBCUTANEOUS at 11:11

## 2019-01-01 RX ADMIN — INSULIN LISPRO 5 UNITS: 100 INJECTION, SOLUTION INTRAVENOUS; SUBCUTANEOUS at 12:04

## 2019-01-01 RX ADMIN — ROPINIROLE HYDROCHLORIDE 0.5 MG: 0.25 TABLET, FILM COATED ORAL at 20:06

## 2019-01-01 RX ADMIN — DONEPEZIL HYDROCHLORIDE 10 MG: 5 TABLET, FILM COATED ORAL at 08:55

## 2019-01-01 RX ADMIN — INSULIN LISPRO 3 UNITS: 100 INJECTION, SOLUTION INTRAVENOUS; SUBCUTANEOUS at 20:54

## 2019-01-01 RX ADMIN — REPAGLINIDE 1 MG: 1 TABLET ORAL at 16:41

## 2019-01-01 RX ADMIN — PRAVASTATIN SODIUM 40 MG: 40 TABLET ORAL at 09:20

## 2019-01-01 RX ADMIN — Medication 10 ML: at 20:07

## 2019-01-01 RX ADMIN — REPAGLINIDE 1 MG: 1 TABLET ORAL at 16:32

## 2019-01-01 RX ADMIN — FERROUS SULFATE TAB 325 MG (65 MG ELEMENTAL FE) 325 MG: 325 (65 FE) TAB at 10:26

## 2019-01-01 RX ADMIN — IPRATROPIUM BROMIDE AND ALBUTEROL SULFATE 1 AMPULE: .5; 3 SOLUTION RESPIRATORY (INHALATION) at 09:19

## 2019-01-01 RX ADMIN — Medication 2 PUFF: at 20:50

## 2019-01-01 RX ADMIN — ROPINIROLE HYDROCHLORIDE 0.5 MG: 0.25 TABLET, FILM COATED ORAL at 19:59

## 2019-01-01 RX ADMIN — LINAGLIPTIN 5 MG: 5 TABLET, FILM COATED ORAL at 09:40

## 2019-01-01 RX ADMIN — Medication 1 G: at 20:06

## 2019-01-01 RX ADMIN — SODIUM CHLORIDE: 9 INJECTION, SOLUTION INTRAVENOUS at 15:30

## 2019-01-01 RX ADMIN — LEVALBUTEROL HYDROCHLORIDE 1.26 MG: 0.63 SOLUTION RESPIRATORY (INHALATION) at 16:12

## 2019-01-01 RX ADMIN — INSULIN LISPRO 4 UNITS: 100 INJECTION, SOLUTION INTRAVENOUS; SUBCUTANEOUS at 17:18

## 2019-01-01 RX ADMIN — IPRATROPIUM BROMIDE AND ALBUTEROL SULFATE 1 AMPULE: .5; 3 SOLUTION RESPIRATORY (INHALATION) at 15:20

## 2019-01-01 RX ADMIN — INSULIN LISPRO 6 UNITS: 100 INJECTION, SOLUTION INTRAVENOUS; SUBCUTANEOUS at 13:28

## 2019-01-01 RX ADMIN — Medication 1 G: at 19:48

## 2019-01-01 RX ADMIN — Medication: at 21:21

## 2019-01-01 RX ADMIN — HEPARIN SODIUM 22 UNITS/KG/HR: 10000 INJECTION, SOLUTION INTRAVENOUS at 19:13

## 2019-01-01 RX ADMIN — POLYETHYLENE GLYCOL 3350 17 G: 17 POWDER, FOR SOLUTION ORAL at 09:36

## 2019-01-01 RX ADMIN — ALPRAZOLAM 0.25 MG: 0.25 TABLET ORAL at 09:23

## 2019-01-01 RX ADMIN — SODIUM CHLORIDE: 9 INJECTION, SOLUTION INTRAVENOUS at 12:31

## 2019-01-01 RX ADMIN — DILTIAZEM HYDROCHLORIDE 180 MG: 180 CAPSULE, COATED, EXTENDED RELEASE ORAL at 12:24

## 2019-01-01 RX ADMIN — IPRATROPIUM BROMIDE AND ALBUTEROL SULFATE 1 AMPULE: .5; 3 SOLUTION RESPIRATORY (INHALATION) at 12:18

## 2019-01-01 RX ADMIN — ASPIRIN 81 MG: 81 TABLET, COATED ORAL at 09:36

## 2019-01-01 RX ADMIN — IPRATROPIUM BROMIDE AND ALBUTEROL SULFATE 1 AMPULE: .5; 3 SOLUTION RESPIRATORY (INHALATION) at 07:35

## 2019-01-01 RX ADMIN — INSULIN LISPRO 8 UNITS: 100 INJECTION, SOLUTION INTRAVENOUS; SUBCUTANEOUS at 08:34

## 2019-01-01 RX ADMIN — LINAGLIPTIN 5 MG: 5 TABLET, FILM COATED ORAL at 12:31

## 2019-01-01 RX ADMIN — POLYETHYLENE GLYCOL 3350 17 G: 17 POWDER, FOR SOLUTION ORAL at 08:34

## 2019-01-01 RX ADMIN — INSULIN GLARGINE 5 UNITS: 100 INJECTION, SOLUTION SUBCUTANEOUS at 10:04

## 2019-01-01 RX ADMIN — Medication 1 G: at 19:56

## 2019-01-01 RX ADMIN — LINAGLIPTIN 5 MG: 5 TABLET, FILM COATED ORAL at 09:20

## 2019-01-01 RX ADMIN — IPRATROPIUM BROMIDE AND ALBUTEROL SULFATE 1 AMPULE: .5; 3 SOLUTION RESPIRATORY (INHALATION) at 15:15

## 2019-01-01 RX ADMIN — ALPRAZOLAM 0.25 MG: 0.25 TABLET ORAL at 08:34

## 2019-01-01 RX ADMIN — ASPIRIN 81 MG 324 MG: 81 TABLET ORAL at 09:39

## 2019-01-01 RX ADMIN — ACETAMINOPHEN 650 MG: 325 TABLET, FILM COATED ORAL at 16:03

## 2019-01-01 RX ADMIN — REPAGLINIDE 1 MG: 1 TABLET ORAL at 12:05

## 2019-01-01 RX ADMIN — IPRATROPIUM BROMIDE AND ALBUTEROL SULFATE 1 AMPULE: .5; 3 SOLUTION RESPIRATORY (INHALATION) at 19:33

## 2019-01-01 RX ADMIN — FERROUS SULFATE TAB 325 MG (65 MG ELEMENTAL FE) 325 MG: 325 (65 FE) TAB at 08:55

## 2019-01-01 RX ADMIN — IPRATROPIUM BROMIDE AND ALBUTEROL SULFATE 1 AMPULE: .5; 3 SOLUTION RESPIRATORY (INHALATION) at 07:27

## 2019-01-01 RX ADMIN — IPRATROPIUM BROMIDE AND ALBUTEROL SULFATE 1 AMPULE: .5; 3 SOLUTION RESPIRATORY (INHALATION) at 09:36

## 2019-01-01 RX ADMIN — INSULIN LISPRO 4 UNITS: 100 INJECTION, SOLUTION INTRAVENOUS; SUBCUTANEOUS at 09:42

## 2019-01-01 RX ADMIN — METOPROLOL TARTRATE 25 MG: 25 TABLET, FILM COATED ORAL at 09:23

## 2019-01-01 RX ADMIN — POLYETHYLENE GLYCOL 3350 17 G: 17 POWDER, FOR SOLUTION ORAL at 09:24

## 2019-01-01 RX ADMIN — DILTIAZEM HYDROCHLORIDE 180 MG: 180 CAPSULE, COATED, EXTENDED RELEASE ORAL at 10:25

## 2019-01-01 RX ADMIN — INSULIN LISPRO 8 UNITS: 100 INJECTION, SOLUTION INTRAVENOUS; SUBCUTANEOUS at 12:06

## 2019-01-01 RX ADMIN — INSULIN LISPRO 4 UNITS: 100 INJECTION, SOLUTION INTRAVENOUS; SUBCUTANEOUS at 17:13

## 2019-01-01 RX ADMIN — IPRATROPIUM BROMIDE AND ALBUTEROL SULFATE 1 AMPULE: .5; 3 SOLUTION RESPIRATORY (INHALATION) at 11:56

## 2019-01-01 RX ADMIN — ACETAMINOPHEN 650 MG: 325 TABLET, FILM COATED ORAL at 17:56

## 2019-01-01 RX ADMIN — Medication 10 ML: at 09:37

## 2019-01-01 RX ADMIN — REPAGLINIDE 1 MG: 1 TABLET ORAL at 17:19

## 2019-01-01 ASSESSMENT — ENCOUNTER SYMPTOMS
EYE REDNESS: 0
CONSTIPATION: 0
DIARRHEA: 0
BLOOD IN STOOL: 0
SHORTNESS OF BREATH: 1
VOMITING: 0
EYE REDNESS: 0
COUGH: 0
WHEEZING: 1
FACIAL SWELLING: 0
ABDOMINAL PAIN: 0
NAUSEA: 0
VOMITING: 0
CHEST TIGHTNESS: 0
CHEST TIGHTNESS: 0
DIARRHEA: 0
VOMITING: 0
CONSTIPATION: 0
COUGH: 1
DIARRHEA: 0
ABDOMINAL DISTENTION: 0
EYE REDNESS: 0
FACIAL SWELLING: 0
ABDOMINAL PAIN: 0
DIARRHEA: 0
NAUSEA: 0
DIARRHEA: 0
ABDOMINAL DISTENTION: 0
CONSTIPATION: 0
EYE DISCHARGE: 0
NAUSEA: 0
ABDOMINAL PAIN: 0
PHOTOPHOBIA: 0
PHOTOPHOBIA: 0
CONSTIPATION: 0
EYE REDNESS: 0
BLOOD IN STOOL: 0
VOMITING: 0
BLOOD IN STOOL: 0
BLOOD IN STOOL: 0
CHEST TIGHTNESS: 1
VOMITING: 0
FACIAL SWELLING: 0
SHORTNESS OF BREATH: 1
PHOTOPHOBIA: 0
EYE DISCHARGE: 0
SHORTNESS OF BREATH: 1
NAUSEA: 0
ABDOMINAL DISTENTION: 0
ABDOMINAL PAIN: 0
FACIAL SWELLING: 0
COUGH: 0
CHEST TIGHTNESS: 0
SHORTNESS OF BREATH: 1
SHORTNESS OF BREATH: 1
EYE DISCHARGE: 0
COUGH: 0
ABDOMINAL DISTENTION: 0
BLOOD IN STOOL: 0
ABDOMINAL DISTENTION: 0
ABDOMINAL PAIN: 0
EYE DISCHARGE: 0
VOMITING: 0
VOMITING: 0
SHORTNESS OF BREATH: 1
COUGH: 0
EYE REDNESS: 0
PHOTOPHOBIA: 0
CONSTIPATION: 0
DIARRHEA: 0
PHOTOPHOBIA: 0
NAUSEA: 0
SHORTNESS OF BREATH: 1
NAUSEA: 0
PHOTOPHOBIA: 0
CHEST TIGHTNESS: 0
CHEST TIGHTNESS: 0
BLOOD IN STOOL: 0
ABDOMINAL PAIN: 0
EYE DISCHARGE: 0
COUGH: 0
EYE REDNESS: 0
DIARRHEA: 0
ABDOMINAL DISTENTION: 0
CONSTIPATION: 0
EYE DISCHARGE: 0
NAUSEA: 0
FACIAL SWELLING: 0
COUGH: 0
FACIAL SWELLING: 0
CHEST TIGHTNESS: 0
ABDOMINAL PAIN: 0

## 2019-01-01 ASSESSMENT — PAIN SCALES - GENERAL
PAINLEVEL_OUTOF10: 4
PAINLEVEL_OUTOF10: 0
PAINLEVEL_OUTOF10: 2
PAINLEVEL_OUTOF10: 0
PAINLEVEL_OUTOF10: 4
PAINLEVEL_OUTOF10: 0
PAINLEVEL_OUTOF10: 5
PAINLEVEL_OUTOF10: 0
PAINLEVEL_OUTOF10: 5
PAINLEVEL_OUTOF10: 0
PAINLEVEL_OUTOF10: 5
PAINLEVEL_OUTOF10: 0
PAINLEVEL_OUTOF10: 0
PAINLEVEL_OUTOF10: 8
PAINLEVEL_OUTOF10: 0

## 2019-01-01 ASSESSMENT — PAIN DESCRIPTION - DESCRIPTORS
DESCRIPTORS: ACHING
DESCRIPTORS: ACHING;SHOOTING;SHARP
DESCRIPTORS: ACHING

## 2019-01-01 ASSESSMENT — PAIN DESCRIPTION - PAIN TYPE
TYPE: ACUTE PAIN
TYPE: CHRONIC PAIN

## 2019-01-01 ASSESSMENT — PAIN DESCRIPTION - LOCATION
LOCATION: BACK
LOCATION: BACK
LOCATION: HIP
LOCATION: HEAD
LOCATION: BACK
LOCATION: BACK

## 2019-01-01 ASSESSMENT — PAIN DESCRIPTION - ORIENTATION
ORIENTATION: RIGHT
ORIENTATION: MID
ORIENTATION: RIGHT

## 2019-01-01 ASSESSMENT — PAIN DESCRIPTION - FREQUENCY
FREQUENCY: INTERMITTENT
FREQUENCY: INTERMITTENT

## 2019-05-22 PROBLEM — J15.9 COMMUNITY ACQUIRED BACTERIAL PNEUMONIA: Status: ACTIVE | Noted: 2019-01-01

## 2019-05-22 PROBLEM — I50.32 CHRONIC DIASTOLIC HEART FAILURE (HCC): Status: ACTIVE | Noted: 2019-01-01

## 2019-05-22 PROBLEM — I48.91 ATRIAL FIBRILLATION, NEW ONSET (HCC): Status: ACTIVE | Noted: 2019-01-01

## 2019-05-22 NOTE — ED PROVIDER NOTES
I independently performed a history and physical on Kay Forrester. All diagnostic, treatment, and disposition decisions were made by myself in conjunction with the advanced practice provider. Briefly, this is a 80 y.o. female here for SOB x 3 days. She does report to me a +hx of afib. Exertion and activity worsens symptoms but she also has some at rest.  Is also reporting a cough. Had some chest pressure earlier but none at this time. .  See MORNEA note      On exam:  Constitutional:  Well developed, well nourished, non-toxic appearance   Eyes:   conjunctiva normal   HENT:  Atraumatic,   Neck- normal range of motion, supple   Respiratory:  No respiratory distress, normal breath sounds, no rales, no wheezing   Cardiovascular:  Rapid rate, irreg  rhythm, no murmurs,   GI:  Soft, nondistended, nontender, no obvious organomegaly, no mass, no rebound, no guarding   Musculoskeletal:  No tenderness, no deformities. Integument:  no rashes on exposed surfaces  Neurologic:  Alert & oriented x 3,   no focal deficits noted   Psychiatric:  Speech and behavior appropriate. No agitation. EKG  Rapid A. fib without acute ischemia    MDM    Plan is to admit this pt with rapid afib. Cardizem given in ED today for HR elevation. Plan to admit. CT shows multifocal pneumonia for which we will provide Rocephin and Zithromax. SEE MORENA NOTE      Critical Care  There was a high probability of life-threatening clinical deterioration in the patient's condition requiring my urgent intervention. Total critical care time with the patient was 31 minutes excluding separately reportable procedures. Critical care required due to patients rapid afib, multifocal pneumonia        Patient Referrals:  No follow-up provider specified. Discharge Medications:  New Prescriptions    No medications on file       FINAL IMPRESSION  1. Rapid atrial fibrillation ([address]    2.  Multifocal pneumonia        Blood pressure 137/60, pulse 112,

## 2019-05-22 NOTE — CARE COORDINATION
Discharge Planning Assessment  SW discharge planner met with patient to discuss reason for admission, current living situation, and potential needs at the time of discharge. Pt in ED d/t A-fib and multifocal pneumonia    Demographics/Insurance verified Yes/No    Current type of dwelling:  House    Living arrangements:  W/dtr who is main caregiver    Level of function/Support:  Pt uses walker at all times and on home oxygen at all times. Dtr is main support. Has other supportive family members. PCP:  Dr. Rizwana Perez    Last Visit to PCP:  6 months ago    DME:  Laura Oas, oxygen, shower chair    Active with any community resources/agencies/skilled home care:  No skilled. [facility] with [facility] for lifeline unit. SW called program informing of admission. Medication compliance issues:  No     Financial issues that could impact healthcare:  No     Transportation at the time of discharge:  Dtr Clifford Vicente    Tentative discharge plan:  Agreeable [facility] if [facility]. No preferences but does NOT want [facility].       Electronically signed by LETICIA Gillespie, LSW on 5/22/2019 at 7:11 PM

## 2019-05-23 PROBLEM — I48.91 ATRIAL FIBRILLATION WITH RVR (HCC): Status: ACTIVE | Noted: 2019-01-01

## 2019-05-23 PROBLEM — R65.20 SEVERE SEPSIS (HCC): Status: ACTIVE | Noted: 2019-01-01

## 2019-05-23 PROBLEM — A41.9 SEVERE SEPSIS (HCC): Status: ACTIVE | Noted: 2019-01-01

## 2019-05-23 NOTE — FLOWSHEET NOTE
Shift assessment complete. VSS. Pt. Is alert and oriented resting comfortably in bed. Cardizem gtt. Infusing and patient HR stable in low 100s. Pt. Very SOB with ambulation and increased activity. Daughter at bedside. POC discussed with family and family states understanding and is in agreement with plan. Bed alarm engaged. Call light and bedside table within reach. 05/23/19 0830   Vital Signs   Temp 97.6 °F (36.4 °C)   Temp Source Temporal   Pulse 120   Heart Rate Source Brachial   Resp 20   BP (!) 155/56   BP Location Left upper arm   MAP (mmHg) 91   Patient Position Sitting   Level of Consciousness 0   MEWS Score 3   Patient Currently in Pain Denies   Pain Assessment   Pain Assessment 0-10   Pain Level 0   Non-Pharmaceutical Pain Intervention(s) Declines   Response to Pain Intervention Patient Satisfied   Oxygen Therapy   SpO2 91 %   Pulse Oximeter Device Mode Intermittent   Pulse Oximeter Device Location Finger   O2 Device Nasal cannula   O2 Flow Rate (L/min) 5 L/min    Will continue to monitor.  No Novoa

## 2019-05-23 NOTE — PROGRESS NOTES
echocardiogram  -Trend troponins    Active Problems:    Type 2 diabetes mellitus ([address]): Stable on sliding scale insulin will continue to monitor and treat appropriately    Aortic stenosis, severe S/P TAVR (transcatheter aortic valve replacement): Stable    COPD, mild ([address] on Chronic hypoxemic respiratory failure ([facility]): No wheezing  -Continue when necessary nebulizer therapy with steroids for pneumonia    Chronic diastolic heart failure ([address] [address]): Stable and euvolemic at this time.    -We'll continue home Lasix every other day. -Monitor intake and output. -Monitor renal function      Medications:  Reviewed  Infusion Medications    dextrose      heparin (porcine)      diltiazem (CARDIZEM) 125 mg in dextrose 5% 125 mL infusion 5 mg/hr (05/22/19 1983)     Scheduled Medications    aspirin  81 mg Oral Daily    ALPRAZolam  0.25 mg Oral BID    mometasone-formoterol  2 puff Inhalation BID    ipratropium-albuterol  1 ampule Inhalation Q4H WA    pravastatin  40 mg Oral Daily    rOPINIRole  0.5 mg Oral Nightly    furosemide  20 mg Oral Every Other Day    ferrous sulfate  325 mg Oral BID    donepezil  10 mg Oral Daily    insulin lispro  0-12 Units Subcutaneous TID WC    insulin lispro  0-6 Units Subcutaneous Nightly    heparin (porcine)  60 Units/kg Intravenous Once    sodium chloride flush  10 mL Intravenous 2 times per day    polyethylene glycol  17 g Oral Daily    pneumococcal 13-valent conjugate  0.5 mL Intramuscular Once    cefTRIAXone (ROCEPHIN) IV  1 g Intravenous Q24H    azithromycin (ZITHROMAX) 500 mg IVPB  250 mg Intravenous Q24H     PRN Meds: glucose, dextrose, glucagon (rDNA), dextrose, heparin (porcine), heparin (porcine), sodium chloride flush, ondansetron, acetaminophen      DVT Prophylaxis: heparin drip  Diet: DIET GENERAL;  Code Status: Full Code    Dispo:  Anticipate discharge in the next 72hrs    ____________________________________________________________________________    Subjective: Overnight Events:   Uneventful overnight. Still continues to complain of shortness of breath with minimal motor activity         Physical Exam Performed:  BP (!) 155/56   Pulse 110   Temp 97.6 °F (36.4 °C) (Temporal)   Resp 20   Ht 4' 11\" (1.499 m)   Wt 104 lb (47.2 kg)   SpO2 93%   BMI 21.01 kg/m²   General appearance: No apparent distress, appears stated age and cooperative. HEENT: Normocephalic, atraumatic, MMM, No sclera icterus/conjuctival palor  Neck: Supple, no thyromegally. No jugular venous distention. Respiratory:  Normal respiratory effort. Bibasal crackles with no wheezing or rhonchi  Cardiovascular: S1/S2 without murmurs, rubs or gallops. RRR  Abdomen: Soft, non-tender, non-distended, bowel sounds present. Musculoskeletal: No clubbing, cyanosis or edema bilaterally. Skin: Skin color, texture, turgor normal.  No rashes or lesions.   Neurologic:  Cranial nerves: II-XII intact, EDMUNDO, No focal sensory/motor deficits  Psychiatric: Alert and oriented, thought content appropriate  Capillary Refill: Brisk,< 3 seconds   Peripheral Pulses: +2 palpable, equal bilaterally       Intake/Output Summary (Last 24 hours) at 5/23/2019 1126  Last data filed at 5/23/2019 0830  Gross per 24 hour   Intake 240 ml   Output 800 ml   Net -560 ml       Labs:   Recent Labs     05/22/19  1541 05/23/19  0526 05/23/19  0917   WBC 15.2* 16.2* 16.8*   HGB 11.2* 9.9* 10.9*   HCT 35.4* 30.5* 33.1*    279 300      Recent Labs     05/22/19  1541 05/23/19  0526    141   K 4.3 4.2   CL 99 100   CO2 21 25   BUN 47* 44*   CREATININE 1.4* 1.5*   CALCIUM 9.3 8.9   AST 12*  --    ALT 9*  --    BILITOT 0.3  --    ALKPHOS 107  --      Recent Labs     05/22/19  1540   TROPONINI <0.01       Urinalysis:    Lab Results   Component Value Date    NITRU Negative 05/02/2017    WBCUA 2 05/02/2017    BACTERIA RARE 01/24/2017    RBCUA 3 05/02/2017    BLOODU TRACE 05/02/2017    SPECGRAV 1.007 05/02/2017    GLUCOSEU 100 05/02/2017 Radiology:  CT CHEST WO CONTRAST   Final Result   Bilateral multifocal pneumonia. No pleural effusion. XR CHEST STANDARD (2 VW)   Final Result   Stable study.                  Tal Calixto MD

## 2019-05-23 NOTE — PROGRESS NOTES
Physical Therapy    Facility/Department: [address] NURSING  Initial Assessment    NAME: Paz Muñoz  : 3/5/1918  MRN: [id number]    Date of Service: 2019    Discharge Recommendations: Paz Muñoz scored a 9/24 on the AM-PAC short mobility form. Current research shows that an AM-PAC score of 17 or less is typically not associated with a discharge to the patient's home setting. Based on the patients AM-PAC score and their current functional mobility deficits, it is recommended that the patient have 3-5 sessions per week of Physical Therapy at d/c to increase the patients independence. PT Equipment Recommendations  Equipment Needed: No    Assessment   Body structures, Functions, Activity limitations: Decreased functional mobility ; Decreased endurance;Decreased strength;Decreased balance;Decreased ADL status  Assessment: Patient presents with the above deficits and is not currently at baseline status. Patient would benefit from further skilled PT intervention in the acute setting and SNF in order to return to PLOF and maximize independence. Prognosis: Good  Decision Making: Medium Complexity  Patient Education: PLB, PT POC, D/C planning, use of call light to return to bed when fatigued   Barriers to Learning: hearing   REQUIRES PT FOLLOW UP: Yes  Activity Tolerance  Activity Tolerance: Patient limited by fatigue;Patient limited by endurance  Activity Tolerance: At start of session, pt on 3L of O2, RN increased to 5L because pt was at 89% spO2 at rest. Pt ranged from mid 80s to mid 90s throughout the session. Instructed on deep breathing  and PLB with several different cues. Patient tends to gulp for air; encouraged to take deep, smooth breaths. The pt's HR varied from 115-125 at rest and with activity. One brief moment, pt's HR increased to 140 when she was standing. RN aware. Patient Diagnosis(es): The primary encounter diagnosis was Rapid atrial fibrillation ([facility]  Diagnoses of Multifocal pneumonia and Severe sepsis [facility]) were also pertinent to this visit. has a past medical history of Back pain, Cancer Skin, CHF (congestive heart failure) ([facility] Community acquired bacterial pneumonia, COPD, mild ([facility] Diabetes mellitus ([facility] Hiatal hernia, Hyperlipidemia, and Hypertension. has a past surgical history that includes Carpal tunnel release; Hysterectomy; skin biopsy; Cataract removal; and Intertrochanteric hip fract. Surgery (Left, 5/12/2016). Restrictions  Restrictions/Precautions  Restrictions/Precautions: Fall Risk, Up as Tolerated(high fall risk)  Position Activity Restriction  Other position/activity restrictions: Kaylie Mcgregor is a 80 y.o. female presents to the emergency department with shortness of breath with moist cough x several days. She does wear oxygen chronically been no history of cigarette smoking. She generally wears 2 L but is requiring 4 L on day of admission. She is confused per family, she is generally very alert and oriented. Daughter states she's been confused for several days. She denies any injury or trauma. Denies fever, states that she is not eating. Dx'd with a-fib and PNA          Subjective  General  Chart Reviewed: Yes  Response To Previous Treatment: Not applicable  Family / Caregiver Present: Yes(Daughter Lei from [place]. )  Diagnosis: PNA  Follows Commands: Within Functional Limits  General Comment  Comments: Patient found seated EOB with HOB raised and pt fully leaning into HOB. RN and family in the room.    Subjective  Subjective: Pt agreeable to PT/OT eval.   Pain Screening  Patient Currently in Pain: Denies  Vital Signs  Patient Currently in Pain: Denies       Orientation -Disoriented to time, oriented to situation and place     Social/Functional History  Social/Functional History  Lives With: Daughter(daughter, Justin Alfonso)  Type of Home: House  Home Layout: One level  Home Access: Level entry  Bathroom Shower/Tub: Shower chair with back,

## 2019-05-23 NOTE — CONSULTS
Palliative Care:      8 year old female with history of CHF, and oxygen dependent COPD presents to hospital with shortness of breath over the past three days. Patient is on 2 liters of oxygen at home. Family reports patient does have some baseline confusion. Echo from 2017 shows EF of 60%. She is s/p TAVR. Started on Cardizem for Atrial Fibrillation with RVR. Started on IV antibiotics for pneumonia. Patient is followed by pulmonary as an outpatient. She has Chronic hypoxemic respiratory failure, Hiatal hernia/GERD/aspiration. Patient is on Aricept for mild dementia. Past Medical History:   has a past medical history of Back pain, Cancer Skin, CHF (congestive heart failure) ([address] Community acquired bacterial pneumonia, COPD, mild ([address] Diabetes mellitus ([address] Hiatal hernia, Hyperlipidemia, and Hypertension. Past Surgical History:   has a past surgical history that includes Carpal tunnel release; Hysterectomy; skin biopsy; Cataract removal; and Intertrochanteric hip fract. Surgery (Left, 2016). Advance Directives:      Code status is full, daughter Sharon Barcenas is DPOA    Problem Severity: Pain/Other Symptoms:      Patient takes Xanax twice a day. Has anxiety. Bed Mobility/Toileting/Transfer:      Daughter helps with homemaking. Provides transportation,    Performance Status:      Patient ambulates with walker. Symptom Assessment: Appetite/Nausea/Bowels/Fatigue:      Patient has BMI of 21. She has trouble eating, she does have a hiatal hernia, GERD, aspiration. She has lost 16 pounds over past two years. Social History:   reports that she has never smoked. She has never used smokeless tobacco. She reports that she does not drink alcohol or use drugs. Family History:  family history includes High Blood Pressure in her sister; Stroke in her brother and sister. Psychological/Spiritual:       Patient lives with her daughter Sharon Barcenas. . She has [employer] at home. One daughter is . One daughter Alicja Schmidt lives in [place]. Family Discussion:      Met with patient and spoke with daughter Alicja Schmidt in the room and daughter Luis M Claudio at home. Patient is very weak and she is short of breath with mild exertion. She denies pain. Daughter Alicja Schmidt states over time patient;s dementia has been slowly worsening. Daughter Luis M Claudio takes very good care of her. Patient has hiatal hernia and GERD and needs to eat small meals. Spoke with daughter Luis M Claudio. She states patient is on home oxygen 24/7 and is followed by Dr Oumou Muniz. Reviewed clinical status with daughter. She states her mother has not had any recent falls. She is the [address] [place] [address]. Discussed code status with daughter, she states patient has a living will and and has been clear regarding \"no life support. \"  Discussed options for code status, daughter states patient has a living will and she \"is pretty sure my mother wants go go without resuscitation. \" States she understands CPR can potentially fracture ribs. States she has discussed with her sister. \"We both agree. No life support, no resuscitation. \" Perfect serve message sent to hospitalist.

## 2019-05-23 NOTE — CONSULTS
[facility]   Cardiac Consultation    Referring Provider:  Josh Villanueva MD     Chief Complaint   Patient presents with    Shortness of Breath     brought in by family with c/o shortness of breath x couple of days. -AF    History of Present Illness:  79 yo admitted with SOB, bilateral multifocal pneumonia, and new onset AF with RVR. She currently denies any chest discomfort though has had more SIOB over the [past several days. She is S/P TAVR with mild perivalvuallar AI and has COPD with home O2. Denies chest discomfort,palpitations or syncope. Has been started on Diltiazem with improved rate control. CHADS-Vasc2 score is 7---11.2% risk and HAS BLED score of 5.8% bleediing risk. Discussed AC options with family. Past Medical History:   has a past medical history of Back pain, Cancer Skin, CHF (congestive heart failure) ([address] Community acquired bacterial pneumonia, COPD, mild ([address] Diabetes mellitus ([address] Hiatal hernia, Hyperlipidemia, and Hypertension. Surgical History:   has a past surgical history that includes Carpal tunnel release; Hysterectomy; skin biopsy; Cataract removal; and Intertrochanteric hip fract. Surgery (Left, 5/12/2016). Social History:   reports that she has never smoked. She has never used smokeless tobacco. She reports that she does not drink alcohol or use drugs. Family History:  family history includes High Blood Pressure in her sister; Stroke in her brother and sister. Home Medications:  See List    Allergies:  Patient has no known allergies. Review of Systems:   · Constitutional: there has been no unanticipated weight loss. There's been no change in energy level, sleep pattern, or activity level. · Eyes: No visual changes or diplopia. No scleral icterus. · ENT: No Headaches, hearing loss or vertigo. No mouth sores or sore throat. · Cardiovascular: Reviewed in HPI  · Respiratory: No cough or wheezing, no sputum production.  No hematemesis. · Gastrointestinal: No abdominal pain, appetite loss, blood in stools. No change in bowel or bladder habits. · Genitourinary: No dysuria, trouble voiding, or hematuria. · Musculoskeletal:  No gait disturbance, weakness or joint complaints. · Integumentary: No rash or pruritis. · Neurological: No headache, diplopia, change in muscle strength, numbness or tingling. No change in gait, balance, coordination, mood, affect, memory, mentation, behavior. · Psychiatric: No anxiety, no depression. · Endocrine: No malaise, fatigue or temperature intolerance. No excessive thirst, fluid intake, or urination. No tremor. · Hematologic/Lymphatic: No abnormal bruising or bleeding, blood clots or swollen lymph nodes. · Allergic/Immunologic: No nasal congestion or hives. Physical Examination:    Vitals:    05/23/19 0936   BP:    Pulse:    Resp: 20   Temp:    SpO2: 93%          Constitutional and General Appearance:   . NAD   SKIN:  .     Warm and dry  EYES:    .     EOMI  Neck:   . Normal carotid contour  Respiratory:  Normal excursion and expansion without use of accessory muscles  Resp Auscultation: Bilateral rhonchi breath sounds without dullness  Cardiovascular: The apical impulses not displaced  Heart tones are crisp and normal--irregular. Cervical veins are not engorged  Normal S1S2, No S3, No Murmur  Peripheral pulses are symmetrical and full  Extremities: There is no clubbing, cyanosis of the extremities. No edema  Femoral Arteries: 2+ and equal  Pedal Pulses: 2+ and equal   Abdomen:  No masses or tenderness  Liver/Spleen: No Abnormalities Noted  Neurological/Psychiatric:  Alert and oriented in all spheres  Moves all extremities well  Exhibits normal gait balance and coordination  No abnormalities of mood, affect, memory    All testing and labs listed below were personally reviewed. Assessment:     1. Rapid atrial fibrillation (HCC) -Continue diltiazem for rate conrol.   Will add heparin while in hospital to monitor response to [address]. Family to consider NOAC in view of bleeding risk. 2. Multifocal pneumonia    3. Severe sepsis (HCC)        Plan:  Heparin  Rate control  Echo to eval LV/Valves  Consider Eliquis if family agrees and acceptable to bleeding risk    Thank you for allowing me to participate in the care of this individual.      Josh Galicia M.D., [facility] [place].

## 2019-05-23 NOTE — H&P
HOSPITALISTS HISTORY AND PHYSICAL    5/22/2019 10:09 PM    Patient Information:  Varinder Wick is a 80 y.o. female [id number]  PCP:  Fredi Baer MD (Tel: [phone] )    Chief complaint:    Chief Complaint   Patient presents with    Shortness of Breath     brought in by family with c/o shortness of breath x couple of days. History of Present Illness: Carol Wu is 80 y.o. female who presented with complaint of SOB. Symptom onset was acute for a time period of 3 days. The severity is described as moderate to severe. The course of his symptoms over time is worsening. The symptoms improved with exertion and worsened with rest. The patient's symptom is associated with cough and chest discomfort. Carol Wu is 80 y.o. female with history of CHF, Afib, COPD - 2L oxygen at home   She presents with complaint of SOB and cough for the past 3 days   According to her family she also has some confusion but at time of interview she appears alert and co-operative   In the ED, oxygen requirement was found to be increased to 4 liter. History obtained from patient and chart review. Old medical records show - echo 4/2017 EF 60%      REVIEW OF SYSTEMS:   Constitutional:  Negative for fever,chills or night sweats  ENT:  Negative for rhinorrhea, epistaxis, hoarseness, sore throat. Respiratory:   Positive for shortness of breath, negative for wheezing  Cardiovascular:   Negative for  chest pain, palpitations   Gastrointestinal:  Negative for nausea, vomiting, diarrhea  Genitourinary:  Negative for polyuria, dysuria   Hematologic/Lymphatic:  Negative for  bleeding tendency, easy bruising  Musculoskeletal:  Negative for myalgias and arthralgias  Neurologic:  Negative for  confusion,dysarthria. Skin:  Negative for itching,rash  Psychiatric:  Negative for depression,anxiety, agitation. Endocrine:  Negative for polydipsia,polyuria,heat /cold intolerance.     Past Medical History:   has a past medical history of Back pain, Cancer Skin, CHF (congestive heart failure) ([facility] Community acquired bacterial pneumonia, COPD, mild ([facility] Diabetes mellitus ([facility] Hiatal hernia, Hyperlipidemia, and Hypertension. Past Surgical History:   has a past surgical history that includes Carpal tunnel release; Hysterectomy; skin biopsy; Cataract removal; and Intertrochanteric hip fract. Surgery (Left, 5/12/2016). Medications:  Prior to Admission medications    Medication Sig Start Date End Date Taking?  Authorizing Provider   nateglinide (STARLIX) 60 MG tablet  10/25/17   Historical Provider, MD   donepezil (ARICEPT) 5 MG tablet Take 10 mg by mouth daily  6/24/17   Historical Provider, MD   albuterol (PROVENTIL) (2.5 MG/3ML) 0.083% nebulizer solution Take 2.5 mg by nebulization three times daily    Historical Provider, MD   SITagliptin (JANUVIA) 50 MG tablet Take 50 mg by mouth daily    Historical Provider, MD   furosemide (LASIX) 20 MG tablet Take 20 mg by mouth every other day    Historical Provider, MD   amLODIPine (NORVASC) 2.5 MG tablet Take 5 mg by mouth daily 1/30/17   Historical Provider, MD   albuterol sulfate  (90 BASE) MCG/ACT inhaler Inhale 2 puffs into the lungs every 6 hours as needed for Wheezing    Historical Provider, MD   budesonide-formoterol (SYMBICORT) 80-4.5 MCG/ACT AERO Inhale 2 puffs into the lungs 2 times daily    Historical Provider, MD   OXYGEN Inhale into the lungs 2 l by cannula 24/7    Historical Provider, MD   aspirin 81 MG EC tablet Take 81 mg by mouth daily    Historical Provider, MD   pravastatin (PRAVACHOL) 40 MG tablet Take 40 mg by mouth daily    Historical Provider, MD   rOPINIRole (REQUIP) 0.5 MG tablet Take 1 mg by mouth nightly Takes 1 or 2 in the evening    Historical Provider, MD   ALPRAZolam (XANAX) 0.25 MG tablet Take 0.25 mg by mouth 2 times daily     Historical Provider, MD   lisinopril (PRINIVIL;ZESTRIL) 5 MG tablet Take 10 mg by mouth daily  12/17/15   Historical Provider, MD vitamin D (CHOLECALCIFEROL) 1000 UNIT TABS tablet   Take 50,000 Units by mouth once a week     Historical Provider, MD   Ferrous Sulfate (IRON) 90 (18 FE) MG TABS Take 1 capsule by mouth 2 times daily     Historical Provider, MD         Allergies:  No Known Allergies     Social History:   reports that she has never smoked. She has never used smokeless tobacco. She reports that she does not drink alcohol or use drugs. Family History:  family history includes High Blood Pressure in her sister; Stroke in her brother and sister. Physical Exam:  BP (!) 159/77   Pulse 117   Temp 99.6 °F (37.6 °C) (Oral)   Resp 20   Ht 4' 11\" (1.499 m)   Wt 103 lb 9.6 oz (47 kg)   SpO2 (!) 89%   BMI 20.92 kg/m²     General appearance:  ill appearing but not in distress, cachectic   Eyes: Sclera clear, pupils equal  ENT: Moist mucus membranes, no thrush. Trachea midline. Cardiovascular: Regular rhythm, normal S1, S2. No murmur, gallop, rub. No edema in lower extremities  Respiratory: Clear to auscultation bilaterally, no wheeze, good inspiratory effort  Gastrointestinal: Abdomen soft, non-tender, not distended, normal bowel sounds  Musculoskeletal: No cyanosis in digits, neck supple  Neurology: Cranial nerves grossly intact. Alert and oriented in time, place and person. No speech or motor deficits  Psychiatry: Appropriate affect.  Not agitated  Skin: Warm, dry, normal turgor, no rash    Labs:  CBC:   Lab Results   Component Value Date    WBC 15.2 05/22/2019    RBC 3.65 05/22/2019    HGB 11.2 05/22/2019    HCT 35.4 05/22/2019    MCV 97.0 05/22/2019    MCH 30.7 05/22/2019    MCHC 31.7 05/22/2019    RDW 14.7 05/22/2019     05/22/2019    MPV 9.1 05/22/2019     BMP:    Lab Results   Component Value Date     05/22/2019    K 4.3 05/22/2019    CL 99 05/22/2019    CO2 21 05/22/2019    BUN 47 05/22/2019    CREATININE 1.4 05/22/2019    CALCIUM 9.3 05/22/2019    GFRAA 42 05/22/2019    GFRAA >60 11/29/2010    LABGLOM 34 05/22/2019    GLUCOSE 398 05/22/2019       Chest Xray:   EKG:    I visualized CT chest images and EKG strips    CT chest - Bilateral multifocal pneumonia. No pleural effusion    Discussed case with ED provider - Dewayne Hankins III, DO     Problem List  Principal Problem:    Community acquired bacterial pneumonia  Active Problems:    Type 2 diabetes mellitus ([facility]    Aortic stenosis, severe    S/P TAVR (transcatheter aortic valve replacement)    COPD, mild (HCC)    Chronic hypoxemic respiratory failure (HCC)    Atrial fibrillation, new onset (HCC)    Chronic diastolic heart failure ([facility]  Resolved Problems:    * No resolved hospital problems. *        Assessment/Plan:   1. Community acquired bacterial pneumonia - blood culture x 2. IV ceftriaxone and IV azithromycin. 2. Acute on chronic respiratory failure - wean oxygen to 2L as tolerated which is her baseline. CXR in 1-2 days  3. Afib with RVR - Continue IV cardizem for rate controlled, not anticoagulated at this time. Consider cardiology consult   4. COPD, stable - inhalers as ordered. Monitor   5. CHF, stable - lasix 20 mg every other day   6. DM II - add sliding scale insulin      Admit as inpatient. I anticipate hospitalization spanning at least two midnights for investigation and treatment of the above medically necessary diagnoses.     Hussein Reynoso MD    5/22/2019 10:09 PM

## 2019-05-23 NOTE — CARE COORDINATION
CM advised by PT that patient will need SNF placement on discharge. Met w/ patient and family, daughter Jenny Guy) states prior admission at [facility] [place] several years ago.  Referral to [facility] ECF sent per family request.     ROSIBEL CastilloN, CCM, RN  [facility]  [phone]

## 2019-05-23 NOTE — ED NOTES
Patient transport to floor with diltiazem and zithromax infusing. Patient visible on portable monitor. Transported by EDT and RN.       Syeda Fowler RN  05/22/19 2026

## 2019-05-23 NOTE — PROGRESS NOTES
4 Eyes Skin Assessment     The patient is being assess for  Admission    I agree that 2 RN's have performed a thorough Head to Toe Skin Assessment on the patient. ALL assessment sites listed below have been assessed. Areas assessed by both nurses: ***  [x]   Head, Face, and Ears   [x]   Shoulders, Back, and Chest  [x]   Arms, Elbows, and Hands   [x]   Coccyx, Sacrum, and IschIum  [x]   Legs, Feet, and Heels        Does the Patient have Skin Breakdown? Yes, blanchable redness to Coccyx.  Sacral mepilex applied        Rodolfo Prevention initiated:  Yes   Wound Care Orders initiated:  Yes      [id number] nurse consulted for Pressure Injury (Stage 3,4, Unstageable, DTI, NWPT, and Complex wounds), New and Established Ostomies:  NA      Nurse 1 eSignature: Electronically signed by Faisal Guerrero RN on 5/22/19 at 11:34 PM     **SHARE this note so that the co-signing nurse is able to place an eSignature**    Nurse 2 eSignature: {Esignature:[id number]}

## 2019-05-24 NOTE — PROGRESS NOTES
[facility] PROGRESS NOTE    5/24/2019 2:17 PM        Name: Adela Loomis .               Admitted: 5/22/2019  Primary Care Provider: Josh Villanueva MD (Tel: [phone])    Brief Course:  Admitted with multifocal pneumonia, new afib, medina       CC:sob    Subjective:  .    6 lit of oxygen  Daughter at bedside  Feels about the same  Cough present   Heart rate on the tele is better controlled at this time   Elevated blood sugars noticed     Reviewed interval ancillary notes    Current Medications    menthol-zinc oxide (CALMOSEPTINE) 0.44-20.6 % ointment BID PRN   diltiazem (CARDIZEM CD) extended release capsule 180 mg Daily   diltiazem 125 mg in dextrose 5 % 125 mL infusion Continuous   repaglinide (PRANDIN) tablet 1 mg TID AC   linagliptin (TRADJENTA) tablet 5 mg Daily   0.9 % sodium chloride infusion Continuous   aspirin EC tablet 81 mg Daily   ALPRAZolam (XANAX) tablet 0.25 mg BID   mometasone-formoterol (DULERA) 100-5 MCG/ACT inhaler 2 puff BID   ipratropium-albuterol (DUONEB) nebulizer solution 1 ampule Q4H WA   pravastatin (PRAVACHOL) tablet 40 mg Daily   rOPINIRole (REQUIP) tablet 0.5 mg Nightly   ferrous sulfate tablet 325 mg BID   donepezil (ARICEPT) tablet 10 mg Daily   glucose (GLUTOSE) 40 % oral gel 15 g PRN   dextrose 50 % solution 12.5 g PRN   glucagon (rDNA) injection 1 mg PRN   dextrose 5 % solution PRN   insulin lispro (HUMALOG) injection pen 0-12 Units TID WC   insulin lispro (HUMALOG) injection pen 0-6 Units Nightly   heparin (porcine) injection 2,830 Units PRN   heparin (porcine) injection 1,420 Units PRN   heparin 25,000 units in dextrose 5% 250 mL infusion Continuous   predniSONE (DELTASONE) tablet 20 mg Daily   sodium chloride flush 0.9 % injection 10 mL 2 times per day   sodium chloride flush 0.9 % injection 10 mL PRN   ondansetron (ZOFRAN) injection 4 mg Q6H PRN   acetaminophen (TYLENOL) tablet 650 mg Q4H PRN polyethylene glycol (GLYCOLAX) packet 17 g Daily   pneumococcal 13-valent conjugate (PREVNAR) injection 0.5 mL Once   cefTRIAXone (ROCEPHIN) 1 g in sterile water 10 mL IV syringe Q24H   azithromycin (ZITHROMAX) 250 mg in dextrose 5 % 250 mL IVPB Q24H       Objective:  BP (!) 114/55   Pulse 110   Temp 97.7 °F (36.5 °C) (Oral)   Resp 18   Ht 4' 11\" (1.499 m)   Wt 106 lb 11.2 oz (48.4 kg)   SpO2 95%   BMI 21.55 kg/m²     Intake/Output Summary (Last 24 hours) at 5/24/2019 1417  Last data filed at 5/24/2019 0837  Gross per 24 hour   Intake 857 ml   Output 475 ml   Net 382 ml    Wt Readings from Last 3 Encounters:   05/24/19 106 lb 11.2 oz (48.4 kg)   02/19/18 117 lb (53.1 kg)   11/21/17 117 lb (53.1 kg)   accessory muscle use, moderate distress  Petite, scoliosis   Lungs diminished   Belly soft  General appearance:  Appears comfortable  Eyes: Sclera clear. Pupils equal.  ENT: Moist oral mucosa. Trachea midline, no adenopathy. Cardiovascular: Regular rhythm, normal S1, S2. No murmur. No edema in lower extremities  GI: Abdomen soft, no tenderness, not distended, normal bowel sounds  Musculoskeletal: No cyanosis in digits, neck supple  Neurology: CN 2-12 grossly intact. No speech or motor deficits  Psych: Normal affect. Alert and oriented in time, place and person  Skin: Warm, dry, normal turgor  Extremity exam shows brisk capillary refill. Peripheral pulses are palpable in lower extremities     Labs and Tests:  CBC:   Recent Labs     05/23/19  0526 05/23/19  0917 05/24/19  0219   WBC 16.2* 16.8* 12.2*   HGB 9.9* 10.9* 9.2*    300 258     BMP:  Recent Labs     05/22/19  1541 05/23/19  0526 05/24/19  0219    141 135*   K 4.3 4.2 3.9   CL 99 100 97*   CO2 21 25 22   BUN 47* 44* 64*   CREATININE 1.4* 1.5* 1.8*   GLUCOSE 398* 193* 307*     Hepatic: Recent Labs     05/22/19  1541   AST 12*   ALT 9*   BILITOT 0.3   ALKPHOS 107     CT CHEST WO CONTRAST   Final Result   Bilateral multifocal pneumonia.   No pleural effusion. XR CHEST STANDARD (2 VW)   Final Result   Stable study. Discussed care with family      Problem List  Principal Problem:    Community acquired bacterial pneumonia  Active Problems:    Type 2 diabetes mellitus ([address]    Aortic stenosis, severe    S/P TAVR (transcatheter aortic valve replacement)    COPD, mild (HCC)    Chronic hypoxemic respiratory failure (HCC)    Rapid atrial fibrillation (HCC)    Chronic diastolic heart failure (HCC)    Severe sepsis (HCC)    Atrial fibrillation with RVR ([address]  Resolved Problems:    * No resolved hospital problems. *       Assessment & Plan:   Acute hypoxic respiratory failure sec to multifocal pneumonia with severe sepsis POA due to above   Rocephin and zithromax,  Urine antigens, sputum cultures.  Prednisone added yesterday     DELIO   Elevated bun/crea/ clinically on dry side,   Gentle hydration, d/w Dr Freddy Alexis     DM with steroid induced hyperglycemia  ISS, resumed home oral hypoglycemic regimen     Afib with RVR on presentation   Heart rate improved, on heparin drip  D/w one of the daughters about benefits/risks of NOAC(no personal h/o major bleeding, functional status good, on asa but does have easy bruising)  Cardiology input noticed, echo done in the morning, probably infection culprit for RVR     Dementia  Aricept     IV Access: peripheral  Shoemaker:   Diet: DIET GENERAL;  Code:DNR-CCA  DVT PPX Heparin drip  Disposition  Unclear  Condition Serious/critical borderline      Rody Mckinney MD   5/24/2019 2:17 PM

## 2019-05-24 NOTE — PROGRESS NOTES
Assessment and med pass complete. Meds taken whole with water. Resting in bed, repositioned with pillow support. Denies needs, call light in reach, bed alarm engaged.

## 2019-05-24 NOTE — PROGRESS NOTES
[address]   Progress Note  Cardiology    CC: AF, AS/TAVR, Pneumonia, Anemia    HPI:  Alert. Denies chest discomfort, SOB, change in exercise tolerance, palpitations or syncope. Rate controled on cardizem. HGB 9.2/28. 3. PTT 41.6      Medications/Labs all Reviewed    Lab Results   Component Value Date    WBC 12.2 (H) 05/24/2019    HGB 9.2 (L) 05/24/2019    HCT 28.3 (L) 05/24/2019    MCV 94.4 05/24/2019     05/24/2019     Lab Results   Component Value Date    CREATININE 1.8 (H) 05/24/2019    BUN 64 (H) 05/24/2019     (L) 05/24/2019    K 3.9 05/24/2019    CL 97 (L) 05/24/2019    CO2 22 05/24/2019     Lab Results   Component Value Date    INR 1.07 05/22/2019    PROTIME 12.2 05/22/2019        Physical Examination:    /64   Pulse 89   Temp 96.8 °F (36 °C) (Temporal)   Resp 18   Ht 4' 11\" (1.499 m)   Wt 106 lb 11.2 oz (48.4 kg)   SpO2 97%   BMI 21.55 kg/m²      Respiratory:  · Resp Assessment: Normal respiratory effort  · Resp Auscultation: Clear to auscultation bilaterally   Cardiovascular:  · Auscultation: irregular rate and rhythm, normal S1S2, no murmur, rub or gallop  · Palpation:  Nl PMI  · JVP:  normal  · Extremities: No Edema  Abdomen:  · Soft, non-tender  · Normal bowel sounds  Extremities:  ·  No Cyanosis or Clubbing  Neurological/Psychiatric:  · Oriented to time, place, and person  · Non-anxious  Skin:   · Warm and dry      Assessment:       Community acquired bacterial pneumonia (5/22/2019)     Type 2 diabetes mellitus ([facility] (3/13/2014)     S/P TAVR (transcatheter aortic valve replacement) (9/17/2015)-Stable valve function     COPD, mild (HCC) (5/16/2017)     Atrial fibrillation with RVR ([facility]) (5/23/2019)-Rate controlled on Cardizem--Will change to oral cardizem today. Continue heparin and monitor counts. Anemia--Slight decrease in HCT--Continue to monitor CBC.       Kari Evans MD, 5/24/2019 7:58 AM

## 2019-05-24 NOTE — PROGRESS NOTES
Renal Consult  Full  Note [id number]  A/P  1. DELIO- crea in 2017 1.2. Probably due to decreased renal perfusion in setting of Pneumonia, relative hypotension and A. Fib with RVR. Daily NSAID use as outpt. DDX: AIN. Clot emboli less likely. NSS for the next 24 hours. 2.   Hyponatremia- follow with NSS. Corrected Na WNL. 3.   HTN- no need for aggressive control  4. Anemia- follow Hgb  5. Pneumonia-on Abx  6. Elevated blood glucose per Medicine. Current regimen acceptable with current GFR. Thank you. Discussed with nurse.

## 2019-05-24 NOTE — PROGRESS NOTES
Shift assessment completed, VSS, patient denies pain. Pt alert to self and place only. Patient with increased unproductive cough, 91% on 5L O2. Cardizem drip to be turned off 30 minutes after oral cardizem administered. Patient to echo, confirmed with echo nurse to turn off cardizem at 0900- see MAR. Patient assisted with bedpan, new bedpan to be utilized with next urine occurrence to obtain urinalysis. Daughter at bedside. Patient and daughter updated on POC, denies questions. Bed in lowest position, bed alarm activated, call light and bedside table within reach. Will continue to monitor.     David Chen

## 2019-05-24 NOTE — PROGRESS NOTES
Renal Note    Notes and labs reviewed  Crea from 2017 1.2  Orders placed  Full note to follow    Thank you

## 2019-05-25 NOTE — PROGRESS NOTES
[facility] PROGRESS NOTE    5/25/2019 9:07 AM        Name: Surinder Jama . Admitted: 5/22/2019  Primary Care Provider: Doron Higgins MD (Tel: [phone])    Brief Course:  Admitted with multifocal pneumonia, new afib, medina       CC:sob    Subjective: Pt has no new complaints.  Her glucose continues to run high    Reviewed interval ancillary notes    Current Medications    insulin glargine (LANTUS) injection pen 5 Units Nightly   menthol-zinc oxide (CALMOSEPTINE) 0.44-20.6 % ointment BID PRN   diltiazem (CARDIZEM CD) extended release capsule 180 mg Daily   diltiazem 125 mg in dextrose 5 % 125 mL infusion Continuous   repaglinide (PRANDIN) tablet 1 mg TID AC   linagliptin (TRADJENTA) tablet 5 mg Daily   0.9 % sodium chloride infusion Continuous   ipratropium-albuterol (DUONEB) nebulizer solution 1 ampule Q4H PRN   aspirin EC tablet 81 mg Daily   ALPRAZolam (XANAX) tablet 0.25 mg BID   mometasone-formoterol (DULERA) 100-5 MCG/ACT inhaler 2 puff BID   ipratropium-albuterol (DUONEB) nebulizer solution 1 ampule Q4H WA   pravastatin (PRAVACHOL) tablet 40 mg Daily   rOPINIRole (REQUIP) tablet 0.5 mg Nightly   ferrous sulfate tablet 325 mg BID   donepezil (ARICEPT) tablet 10 mg Daily   glucose (GLUTOSE) 40 % oral gel 15 g PRN   dextrose 50 % solution 12.5 g PRN   glucagon (rDNA) injection 1 mg PRN   dextrose 5 % solution PRN   insulin lispro (HUMALOG) injection pen 0-12 Units TID WC   insulin lispro (HUMALOG) injection pen 0-6 Units Nightly   heparin (porcine) injection 2,830 Units PRN   heparin (porcine) injection 1,420 Units PRN   heparin 25,000 units in dextrose 5% 250 mL infusion Continuous   predniSONE (DELTASONE) tablet 20 mg Daily   sodium chloride flush 0.9 % injection 10 mL 2 times per day   sodium chloride flush 0.9 % injection 10 mL PRN   ondansetron (ZOFRAN) injection 4 mg Q6H PRN   acetaminophen (TYLENOL) tablet 650 mg Q4H PRN   polyethylene glycol (GLYCOLAX) packet 17 g Daily   pneumococcal 13-valent conjugate (PREVNAR) injection 0.5 mL Once   cefTRIAXone (ROCEPHIN) 1 g in sterile water 10 mL IV syringe Q24H   azithromycin (ZITHROMAX) 250 mg in dextrose 5 % 250 mL IVPB Q24H       Objective:  Vitals: /72   Pulse 84   Temp 96.1 °F (35.6 °C) (Axillary)   Resp 20   Ht 4' 11\" (1.499 m)   Wt 108 lb 11.2 oz (49.3 kg)   SpO2 90%   BMI 21.95 kg/m²   General appearance: WD/WN with scoliosis 8 y.o. year-old  female who is alert, appears stated age and is cooperative  HEENT: Head: Normocephalic, no lesions, without obvious abnormality. Eye: Normal external eye, conjunctiva, lids cornea, LING. Ears: Normal TM's bilaterally. Normal auditory canals and external ears. Non-tender. Nose: Normal external nose, mucus membranes and septum. Pharynx: Dental Hygiene adequate. Normal buccal mucosa. Normal pharynx. Neck: no adenopathy, no carotid bruit, no JVD, supple, symmetrical, trachea midline and thyroid not enlarged, symmetric, no tenderness/mass/nodules  Lungs: diminished on auscultation bilaterally and some use of accessory muscles. Heart: regular rate and rhythm, S1, S2 normal, no murmur, click, rub or gallop and normal apical impulse  Abdomen: soft, non-tender; bowel sounds normal; no masses,  no organomegaly  Extremities: extremities normal, atraumatic, no cyanosis or edema and Homans sign is negative, no sign of DVT  Skin: Skin color, texture, turgor normal. No rashes or lesions  Neurologic: Mental status: Alert, oriented, thought content appropriate, Cranial nerves II-XII intact; grossly non focal. Gait was not tested.       Labs and Tests:  CBC:   Recent Labs     05/23/19  0917 05/24/19  0219 05/25/19  0617   WBC 16.8* 12.2* 16.4*   HGB 10.9* 9.2* 9.8*    258 288     BMP:    Recent Labs     05/23/19  0526 05/24/19  0219 05/25/19  0617    135* 134*   K 4.2 3.9 4.7    97* 96*   CO2 25 22 21   BUN 44* 64* 66*   CREATININE 1.5* 1.8* 1.4*   GLUCOSE 193* 307* 374*     Hepatic:   Recent Labs     05/22/19  1541   AST 12*   ALT 9*   BILITOT 0.3   ALKPHOS 107     CT CHEST WO CONTRAST   Final Result   Bilateral multifocal pneumonia. No pleural effusion. XR CHEST STANDARD (2 VW)   Final Result   Stable study. Problem List  Principal Problem:    Community acquired bacterial pneumonia  Active Problems:    Type 2 diabetes mellitus ([facility]    Aortic stenosis, severe    S/P TAVR (transcatheter aortic valve replacement)    COPD, mild (HCC)    Chronic hypoxemic respiratory failure (HCC)    Rapid atrial fibrillation (HCC)    Chronic diastolic heart failure (HCC)    Severe sepsis (HCC)    Atrial fibrillation with RVR ([facility]  Resolved Problems:    * No resolved hospital problems.  *       Assessment & Plan:     Acute hypoxic respiratory failure - secondary to multifocal pneumonia with severe sepsis POA due to above   - Continue Rocephin and Zithromax  - Urine antigens in process  - Sputum cultures ordered previously  - Prednisone added 5/23/2019    Acute renal failure  - Elevated bun/crea/ clinically on dry side, improving with gental hydration  - Gentle hydration per nephrology    DM with steroid induced hyperglycemia  - Glucose running high; continue ISS, home oral hypoglycemic regimen and add low dose Lantus    Afib with RVR on presentation - Rate improved; probably infection culprit for RVR   - Now on oral Cardizem  - Continues on Heparin drip; D/w one of the daughters about benefits/risks of NOAC(no personal h/o major bleeding, functional status good, on asa but does have easy bruising)  - Echocardiogram 5/24/2019 with EF 60-65%, Indeterminate diastolic function  - Cardiology consult appreciated    Dementia  - Continue Aricept         IV Access: peripheral  Shoemaker:   Diet: DIET GENERAL;  Code:DNR-CCA  DVT PPX Heparin drip  Disposition  Unclear  Condition Serious/critical borderline      A[place] [place] Carlos Baldwin MD   5/25/2019 9:07 AM

## 2019-05-25 NOTE — PROGRESS NOTES
2202.05 ml   Output 825 ml   Net 1377.05 ml       · Telemetry: Afib   · Constitutional: Oriented. No distress. · Head: Normocephalic and atraumatic. · Mouth/Throat: Oropharynx is clear and moist.   · Eyes: Conjunctivae normal. EOM are normal.   · Neck: Neck supple. No rigidity. No JVD present. · Cardiovascular: Tachycardic, Irregular rhythm, S1&S2. Systolic murmur   · Pulmonary/Chest: Bilateral coarse BS. · Abdominal: Soft. Bowel sounds present. No tenderness. · Musculoskeletal: No tenderness. No edema    · Lymphadenopathy: Has no cervical adenopathy. · Neurological: Alert and oriented. Hard of hearing   · Skin: Skin is warm and dry. No rash noted. · Psychiatric: Has a normal behavior     Labs:  Reviewed. Recent Labs     05/23/19  0526 05/24/19 0219 05/25/19  0617    135* 134*   K 4.2 3.9 4.7    97* 96*   CO2 25 22 21   BUN 44* 64* 66*   CREATININE 1.5* 1.8* 1.4*     Recent Labs     05/23/19  0917 05/24/19  0219 05/25/19  0617   WBC 16.8* 12.2* 16.4*   HGB 10.9* 9.2* 9.8*   HCT 33.1* 28.3* 30.1*   MCV 93.6 94.4 93.5    258 288     Lab Results   Component Value Date    TROPONINI <0.01 05/24/2019     CrCl cannot be calculated (Unknown ideal weight.).    Lab Results   Component Value Date     03/13/2014     Lab Results   Component Value Date    PROTIME 12.2 05/22/2019    PROTIME 10.4 01/24/2017    PROTIME 11.0 05/13/2016    INR 1.07 05/22/2019    INR 0.91 01/24/2017    INR 0.97 05/13/2016     Lab Results   Component Value Date    CHOL 116 03/14/2014    HDL 56 03/14/2014    TRIG 68 03/14/2014       Scheduled Meds:   insulin glargine  5 Units Subcutaneous Daily    metoprolol tartrate  25 mg Oral BID    diltiazem  180 mg Oral Daily    repaglinide  1 mg Oral TID AC    linagliptin  5 mg Oral Daily    aspirin  81 mg Oral Daily    ALPRAZolam  0.25 mg Oral BID    mometasone-formoterol  2 puff Inhalation BID    ipratropium-albuterol  1 ampule Inhalation Q4H WA    pravastatin 40 mg Oral Daily    rOPINIRole  0.5 mg Oral Nightly    ferrous sulfate  325 mg Oral BID    donepezil  10 mg Oral Daily    insulin lispro  0-12 Units Subcutaneous TID WC    insulin lispro  0-6 Units Subcutaneous Nightly    predniSONE  20 mg Oral Daily    sodium chloride flush  10 mL Intravenous 2 times per day    polyethylene glycol  17 g Oral Daily    pneumococcal 13-valent conjugate  0.5 mL Intramuscular Once    cefTRIAXone (ROCEPHIN) IV  1 g Intravenous Q24H    azithromycin (ZITHROMAX) 500 mg IVPB  250 mg Intravenous Q24H     Continuous Infusions:   diltiazem (CARDIZEM) 125 mg in dextrose 5% 125 mL infusion Stopped (05/24/19 0900)    sodium chloride 90 mL/hr at 05/25/19 0211    dextrose      heparin (porcine) 24 Units/kg/hr (05/25/19 0654)     PRN Meds:menthol-zinc oxide, ipratropium-albuterol, glucose, dextrose, glucagon (rDNA), dextrose, heparin (porcine), heparin (porcine), sodium chloride flush, ondansetron, acetaminophen     Diagnostic and imaging results reviewed.      Patient Active Problem List    Diagnosis Date Noted    Acute dyspnea      Priority: High    5/12/16 LEFT IM nail      Priority: High    Severe sepsis ([facility] 05/23/2019    Atrial fibrillation with RVR ([facility] 05/23/2019    Community acquired bacterial pneumonia 05/22/2019    Rapid atrial fibrillation ([facility] 05/22/2019    Chronic diastolic heart failure ([facility] 05/22/2019    COPD, mild ([facility] 05/16/2017    Centrilobular emphysema ([facility] 05/16/2017    Chronic hypoxemic respiratory failure ([facility] 05/16/2017    Shortness of breath 09/28/2016    Nonrheumatic aortic valve stenosis     History of aortic stenosis     S/P TAVR (transcatheter aortic valve replacement) 09/17/2015    Aortic stenosis, severe 57/11/0214    Acute systolic CHF 00/86/5579    Type 2 diabetes mellitus (HCC) 03/13/2014    Acute diastolic CHF (congestive heart failure) ([facility] 03/13/2014    Acute respiratory failure ([facility] 03/13/2014    Hiatal hernia 03/13/2014      Active Hospital Problems    Diagnosis Date Noted    Severe sepsis ([facility] [A41.9, R65.20] 05/23/2019    Atrial fibrillation with RVR ([facility] [I48.91] 05/23/2019    Community acquired bacterial pneumonia [J15.9] 05/22/2019    Rapid atrial fibrillation (HCC) [I48.91] 05/22/2019    Chronic diastolic heart failure (HCC) [I50.32] 05/22/2019    Chronic hypoxemic respiratory failure (HCC) [J96.11] 05/16/2017    COPD, mild ([facility] [J44.9] 05/16/2017    S/P TAVR (transcatheter aortic valve replacement) [Z95.2] 09/17/2015    Aortic stenosis, severe [I35.0] 03/12/2015    Type 2 diabetes mellitus ([facility] [E11.9] 03/13/2014     Echo:    -Normal global systolic function with an ejection fraction estimated at   60-65%.  -No obvious regional wall motion abnormalities noted.   -The bioprosthetic TAVR artificial aortic valve appears well seated with a   maximum velocity of 2.13m/s and a mean gradient of 10 mmHg. There is mild   perivalvular aortic regurgitation.   -Thickened mitral valve without evidence of stenosis. Moderate mitral   annular calcification is present. There is moderate mitral regurgitation.   -There is moderate tricuspid regurgitation with a RVSP estimation of 51   mmHg.   -Indeterminate diastolic function. Assessment:   - Persistent atrial fibrillation:    Rate ~ 110  on tele    Lenient rate control is acceptable. Added metoprolol for better rate control . Recommend changing to low dose DOACs when renal failure is improved. High [id number] score and high risk for stroke and thromboembolism. - S/p TAVR    Stable. Mild perivalvular regurgitation noted. - Multifocal pneumonia:    On antibiotic therapy. - Hyponatremia/DELIO:    Received NS. Nephrology following. Multiple medical conditions with risk of decompensation. NOTE: This report was transcribed using voice recognition software.  Every effort was made to ensure accuracy, however, inadvertent computerized transcription errors may be present.      Magdalene Saldivar MD, MPH  [facility]   Office: [phone]

## 2019-05-25 NOTE — PROGRESS NOTES
pTT 111 due to heparin drip. Due to such high difference in previous pTTs, verified with lab it was drawn from the other arm. Heparin drip paused for an hour per policy.

## 2019-05-25 NOTE — PROGRESS NOTES
[facility] Note    Patient Active Problem List   Diagnosis    Acute systolic CHF    Type 2 diabetes mellitus (HCC)    Acute diastolic CHF (congestive heart failure) (HCC)    Acute respiratory failure (HCC)    Hiatal hernia    Aortic stenosis, severe    S/P TAVR (transcatheter aortic valve replacement)    5/12/16 LEFT IM nail    History of aortic stenosis    Nonrheumatic aortic valve stenosis    Shortness of breath    Acute dyspnea    COPD, mild (HCC)    Centrilobular emphysema (HCC)    Chronic hypoxemic respiratory failure ([address]    Community acquired bacterial pneumonia    Rapid atrial fibrillation (HCC)    Chronic diastolic heart failure (HCC)    Severe sepsis (HCC)    Atrial fibrillation with RVR (HCC)       Past Medical History:   has a past medical history of Back pain, Cancer Skin, CHF (congestive heart failure) ([address] Community acquired bacterial pneumonia, COPD, mild ([address] Diabetes mellitus ([address] Hiatal hernia, Hyperlipidemia, and Hypertension. Past Social History:   reports that she has never smoked. She has never used smokeless tobacco. She reports that she does not drink alcohol or use drugs. Subjective:  Still short of breath    Review of Systems   Constitutional: Positive for fatigue. Negative for activity change, appetite change, chills, fever and unexpected weight change. HENT: Negative for congestion and facial swelling. Eyes: Negative for photophobia, discharge and redness. Respiratory: Positive for shortness of breath. Negative for cough and chest tightness. Cardiovascular: Negative for chest pain, palpitations and leg swelling. Gastrointestinal: Negative for abdominal distention, abdominal pain, blood in stool, constipation, diarrhea, nausea and vomiting. Endocrine: Negative for cold intolerance, heat intolerance and polyuria.    Genitourinary: Negative for decreased urine volume, difficulty urinating, flank pain and hematuria. Musculoskeletal: Negative for joint swelling and neck pain. Neurological: Negative for dizziness, seizures, syncope, speech difficulty, light-headedness and headaches. Hematological: Does not bruise/bleed easily. Psychiatric/Behavioral: Negative for agitation, confusion and hallucinations. Objective:      BP (!) 153/72   Pulse 102   Temp 96.9 °F (36.1 °C) (Temporal)   Resp 18   Ht 4' 11\" (1.499 m)   Wt 108 lb 11.2 oz (49.3 kg)   SpO2 91%   BMI 21.95 kg/m²     Wt Readings from Last 3 Encounters:   05/25/19 108 lb 11.2 oz (49.3 kg)   02/19/18 117 lb (53.1 kg)   11/21/17 117 lb (53.1 kg)       BP Readings from Last 3 Encounters:   05/25/19 (!) 153/72   02/26/19 139/64   08/21/18 (!) 125/58     Chest- rhonchi b/l  Heart-regular  Abd-soft  Ext- no edema    Labs  Hemoglobin   Date Value Ref Range Status   05/25/2019 9.8 (L) 12.0 - 16.0 g/dL Final     Hematocrit   Date Value Ref Range Status   05/25/2019 30.1 (L) 36.0 - 48.0 % Final     WBC   Date Value Ref Range Status   05/25/2019 16.4 (H) 4.0 - 11.0 K/uL Final     Platelets   Date Value Ref Range Status   05/25/2019 288 135 - 450 K/uL Final     Lab Results   Component Value Date    CREATININE 1.4 (H) 05/25/2019    BUN 66 (H) 05/25/2019     (L) 05/25/2019    K 4.7 05/25/2019    CL 96 (L) 05/25/2019    CO2 21 05/25/2019     Fransisca-26    Assessment/Plan:  1. Acute kidney injury, creatinine on presentation was 1.4. In  08/2017, creatinine was 1.2. DELIO probably due to decreased renal  perfusion in the setting of relative hypotension, atrial fibrillation  with rapid ventricular response and pneumonia. Decrease NSS rate. 2.  Hyponatremia. Follow for now. Check Uosm. 3.  Hypertension, no need for aggressive control at this time. 4.  Anemia, follow hemoglobin. 5.  Bilateral pneumonia on antibiotics, management as per Medicine.   6.  Elevated blood glucose, blood glucose is currently uncontrolled on  sliding scale insulin and linagliptin, also on Prandin. Management as  per Medicine. 7.  A. Fib - per Medicine. Discussed with nurse.      Bravo Allen MD

## 2019-05-25 NOTE — CONSULTS
[address]                     [facility], [address]                                  CONSULTATION    PATIENT NAME: James Vance                      :        1918  MED REC NO:   [id number]                          ROOM:       8605  ACCOUNT NO:   [de-identified]                           ADMIT DATE: 2019  PROVIDER:     Suzanna Hughes MD    RENAL CONSULTATION    CONSULT DATE:  2019    CONSULTING PHYSICIAN:  Suzanna Hughes MD    REFERRING PROVIDER:  Noe Aponte MD    REASON FOR CONSULTATION:  Acute kidney injury, hyponatremia. HISTORY OF PRESENT ILLNESS:  The patient is a 8-year-old female with  history of congestive heart failure, ejection fraction of 60-65%,  moderate bicuspid regurgitation, COPD, diabetes mellitus, hiatal hernia,  hyperlipidemia, hypertension who presented to the hospital on 2019  secondary to shortness of breath. She is admitted for community  acquired bacterial pneumonia and currently on antibiotics. She also has  atrial fibrillation with rapid ventricular response. I am currently  asked to see the patient because her creatinine has steadily been going  up from 1.4 to 1.8. BUN has also increased from 47 to 64. Her WBC  count peaked at 16.8. Her lowest systolic blood pressure is in the 90s  range. According to her, she has also been taking Advil every day for  several years. She denies any nausea, vomiting or diarrhea. She denies  any skin rash or itching. She denies any abdominal pain. PAST MEDICAL HISTORY:  Includes congestive heart failure with preserved  EF, chronic back pain, skin cancer, pneumonia, COPD, diabetes mellitus,  hiatal hernia, hyperlipidemia, hypertension. ALLERGIES:  None.     CURRENT MEDICATIONS:  Includes alprazolam, aspirin, azithromycin,  ceftriaxone, diltiazem p.o., donepezil, ferrous sulfate, heparin,  insulin lispro, ipratropium plus albuterol, linagliptin, mometasone plus  formoterol, polyethylene glycol, pravastatin, prednisone, repaglinide,  ropinirole, normal saline. SOCIAL HISTORY:  No active smoking, alcohol or drug abuse. FAMILY HISTORY:  Includes hypertension and CVA. REVIEW OF SYSTEMS:  GENERAL:  Positive malaise. SKIN:  No skin rash, itching, or discharge. NEUROLOGIC:  No headaches or focal deficits. Decreased hearing. CARDIOVASCULAR:  No chest pain or palpitations. PULMONARY:  Episodes of shortness of breath. No coughing, no  hemoptysis. GASTROINTESTINAL:  No abdominal pain. No nausea. No vomiting, diarrhea  or constipation. RENAL:  Denies any gross hematuria. ENDOCRINE:  No history of diabetes. No heat or cold intolerance. INFECTIOUS DISEASES:  No fever or chills. MUSCULOSKELETAL:  Denies any active joint pains. PHYSICAL EXAMINATION:  VITAL SIGNS:  Blood pressure 165/61, pulse 102, respirations 20,  temperature 97.2, saturating 91%. Weight listed at 106 pounds or 48.4  kilos. Urine output recorded at 325 mL, positive 60 mL since admission. GENERAL:  Appears short of breath. HEENT:  Anicteric sclerae, clear conjunctivae. SKIN:  Anicteric, no rash. CHEST:  Rhonchi bilaterally. HEART:  Regular. ABDOMEN:  Soft. Nontender. No rebound or involuntary guarding. EXTREMITIES:  Shows no edema. NECK:  No JVD. LABORATORY DATA:  Sodium 135, potassium 3.9, chloride 97, bicarb 22, BUN  64, creatinine 1.8, glucose 307, calcium 8.1. ProBNP 6849. Albumin  3.5. WBC 12, hemoglobin 9.2, hematocrit 28, platelet count 547,831. Urinalysis is not available. DIAGNOSTIC DATA:  Chest x-ray shows stable study, lungs are clear. CT  scan of the chest without contrast shows bilateral multifocal pneumonia  with no pleural effusion. ASSESSMENT AND PLAN:  1. Acute kidney injury, creatinine on presentation was 1.4. In  08/2017, creatinine was 1.2. DELIO probably due to decreased renal  perfusion in the setting of relative hypotension.   Atrial fibrillation  with rapid ventricular response and pneumonia. Would start normal  saline at 90 mL per hour for the next 24 hours. Clot emboli from atrial  fibrillation less likely. 2.  Hyponatremia. Follow with normal saline administration. 3.  Hypertension, no need for aggressive control at this time. 4.  Anemia, follow hemoglobin. 5.  Bilateral pneumonia on antibiotics, management as per Medicine. 6.  Elevated blood glucose, blood glucose is currently uncontrolled on  sliding scale insulin and linagliptin, also on Prandin. Management as  per Medicine. Thank you very much for this consult. We will follow with you.         Attila Sneed MD    D: 05/24/2019 11:54:04       T: 05/25/2019 1:22:27     [email or web address]  Job#: [id number]     Doc#: [id number]    CC:

## 2019-05-25 NOTE — PROGRESS NOTES
Assessment and med pass complete. Meds taken whole with water. Assisted with bedpan. Complaints of not being able to breath. O2 in high 80's on 6L nasal canula. Called RT. Was able to get oxygen level up with oxygen running through the high flow nasal cannula and with a ventri mask over nose and mouth since patient is mouth breather despite being encouraged to breath through nose. Repositioned onto side with pillow support. Denies other needs, call light in reach, bed alarm engaged, will continue to monitor.

## 2019-05-25 NOTE — PROGRESS NOTES
During 1600 vitals, patient c/o increasing shortness of breath. Respiratory notified, breathing treatment administered. This RN and RT rounded on patient again, O2 78% on 7L. Patient is a mouth breather, non-rebreather placed on patient. Swelling noted in bilateral hands, IVF stopped. Coarse/rhonci noted upon auscultation. With non-breather, O2 94%. Signficant JVD noted. Patient laying comfortably in bed, daughter at bedside. MD notified via [employer] requesting stat portable chest xray. Awaiting response. Will continue to monitor.

## 2019-05-25 NOTE — PROGRESS NOTES
No complaints of difficulty breathing despite oxygen level being 85% on 6L high flow, after assisting with bedpan and repositioning, O2 level 94%.

## 2019-05-25 NOTE — SIGNIFICANT EVENT
Called to review the patient with increased O2 demand and raspy breath sounds with wet cough  She was admitted with sepsis due to pneumonia complicated by atrial fibrillation with rapid ventricular response with known chronic hypoxic respiratory failure  She received IV fluids for renal failure over the last 30 hours at 100 cc an hour  Chest examination with wheezing and crackles in the bases  Chest x-ray noted for increased multifocal airspace disease with superimposed pulmonary vascular congestion and bilateral pleural effusion  I suspect fluid overload and worsening her respiratory status  -We'll stop IV fluids  -Give Lasix IV stat  -Continue O2 supplementation  -If symptoms worsen to consider BiPAP  -Code status reviewed with him his daughter bedside maintains DNR CCA, no life supporting measures, no chest compressions.

## 2019-05-26 NOTE — PROGRESS NOTES
[address]   Progress Note  Cardiology    CC: AF, AS/TAVR, Pneumonia, Anemia, Respiratory failure    HPI:  Opens eyes, tachypnea, NRB mask in place with audible congested respiratory effort. Rate controled on cardizem. HGB 9.6/29.8  PTT 82.1  CXR shows progressive bilateral pulmonary infiltrates despite diuresuis and AB Rx. WBC 15,700. Echo confirms normal LV FXN with normal AV FXN. Long discussion with family regarding EOL care. Medications/Labs all Reviewed    Lab Results   Component Value Date    WBC 15.7 (H) 05/26/2019    HGB 9.6 (L) 05/26/2019    HCT 29.8 (L) 05/26/2019    MCV 94.4 05/26/2019     05/26/2019     Lab Results   Component Value Date    CREATININE 1.5 (H) 05/26/2019    BUN 68 (H) 05/26/2019     05/26/2019    K 5.0 05/26/2019     05/26/2019    CO2 25 05/26/2019     Lab Results   Component Value Date    INR 1.07 05/22/2019    PROTIME 12.2 05/22/2019        Physical Examination:    BP (!) 123/47 Comment: notified Ema RN.    Pulse 84   Temp 97.3 °F (36.3 °C) (Temporal)   Resp 24   Ht 4' 11\" (1.499 m)   Wt 112 lb (50.8 kg)   SpO2 97%   BMI 22.62 kg/m²      Respiratory:  · Resp Assessment: Weak respiratory effort--Audible congestion with excessive secretions  · Resp Auscultation: Bilateral rhochi with decreased BS to auscultation bilaterally   Cardiovascular:  · Auscultation: irregular rate and rhythm, normal O7D2, systolic murmur, rub or gallop  · Palpation:  Nl PMI  · JVP:  normal  · Extremities: No Edema  Abdomen:  · Soft, non-tender  · Normal bowel sounds  Extremities:  ·  No Cyanosis or Clubbing  Neurological/Psychiatric:  · Oriented to time, place, and person  · Non-anxious  Skin:   · Warm and dry    Labs reviewed and discussed with staff    Assessment:       Community acquired bacterial pneumonia (5/22/2019)     Type 2 diabetes mellitus ([facility] [address] (3/13/2014)     S/P TAVR (transcatheter aortic valve replacement) (9/17/2015)-Stable valve function  Continue diuresis as tolerated. COPD, mild ([facility] [address] (5/16/2017)     Atrial fibrillation with RVR ([facility] [address] (5/23/2019)-Rate controlled on Cardizem-. Continue heparin at low dose    Anemia--Continue to monitor CBC. Prognosis is very poor--palliative care appropriate. Total care time 35 minutes.       Ivonne Alejandre MD, 5/26/2019 8:10 AM

## 2019-05-26 NOTE — PROGRESS NOTES
[facility] PROGRESS NOTE    5/26/2019 9:12 AM        Name: Myriam Mcdonald . Admitted: 5/22/2019  Primary Care Provider: Linda Angelo MD (Tel: [phone])    Brief Course:  Admitted with multifocal pneumonia, new afib, medina       CC:sob    Subjective: Pt sleeping. Worsening Pneumonia. Pneumococcal positive.  Family considering Hospice    Reviewed interval ancillary notes    Current Medications    cefTRIAXone (ROCEPHIN) 2 g IVPB in D5W 50ml minibag Q24H   insulin glargine (LANTUS) injection pen 5 Units Daily   metoprolol tartrate (LOPRESSOR) tablet 25 mg BID   menthol-zinc oxide (CALMOSEPTINE) 0.44-20.6 % ointment BID PRN   diltiazem (CARDIZEM CD) extended release capsule 180 mg Daily   diltiazem 125 mg in dextrose 5 % 125 mL infusion Continuous   repaglinide (PRANDIN) tablet 1 mg TID AC   linagliptin (TRADJENTA) tablet 5 mg Daily   ipratropium-albuterol (DUONEB) nebulizer solution 1 ampule Q4H PRN   aspirin EC tablet 81 mg Daily   ALPRAZolam (XANAX) tablet 0.25 mg BID   mometasone-formoterol (DULERA) 100-5 MCG/ACT inhaler 2 puff BID   ipratropium-albuterol (DUONEB) nebulizer solution 1 ampule Q4H WA   pravastatin (PRAVACHOL) tablet 40 mg Daily   rOPINIRole (REQUIP) tablet 0.5 mg Nightly   ferrous sulfate tablet 325 mg BID   donepezil (ARICEPT) tablet 10 mg Daily   glucose (GLUTOSE) 40 % oral gel 15 g PRN   dextrose 50 % solution 12.5 g PRN   glucagon (rDNA) injection 1 mg PRN   dextrose 5 % solution PRN   insulin lispro (HUMALOG) injection pen 0-12 Units TID WC   insulin lispro (HUMALOG) injection pen 0-6 Units Nightly   heparin 25,000 units in dextrose 5% 250 mL infusion Continuous   predniSONE (DELTASONE) tablet 20 mg Daily   sodium chloride flush 0.9 % injection 10 mL 2 times per day   sodium chloride flush 0.9 % injection 10 mL PRN   ondansetron (ZOFRAN) injection 4 mg Q6H PRN   acetaminophen (TYLENOL) tablet 650 BILITOT, ALKPHOS in the last 72 hours. XR CHEST PORTABLE   Final Result   Increasing right greater than left multifocal airspace disease most   consistent with worsening pneumonia. Suspicion of superimposed congestive   heart failure with pulmonary vascular congestion and new small bilateral   pleural effusions. CT CHEST WO CONTRAST   Final Result   Bilateral multifocal pneumonia. No pleural effusion. XR CHEST STANDARD (2 VW)   Final Result   Stable study. Problem List  Principal Problem:    Community acquired bacterial pneumonia  Active Problems:    Type 2 diabetes mellitus ([address]    Aortic stenosis, severe    S/P TAVR (transcatheter aortic valve replacement)    COPD, mild (HCC)    Chronic hypoxemic respiratory failure (HCC)    Rapid atrial fibrillation (HCC)    Chronic diastolic heart failure (HCC)    Severe sepsis (HCC)    Atrial fibrillation with RVR ([address]  Resolved Problems:    * No resolved hospital problems.  *       Assessment & Plan:     Acute hypoxic respiratory failure - secondary to multifocal pneumonia with severe sepsis POA due to above   - Increased O2 demands, worsening Pneumonia on CXR 05/25/2019  - Strep Pneumo positive - increase Rocephin to 2 grams/day, DC Azithromycin  - Legionella urine antigens negative  - Sputum cultures ordered previously  - Prednisone added 5/23/2019    Acute renal failure  - Elevated bun/crea/ initially on dry side, improved with gental hydration.   - Given Laxix x 1 for suspected fluid overload yesterday    DM with steroid induced hyperglycemia  - Glucose running high; continue ISS, home oral hypoglycemic regimen and add low dose Lantus    Afib with RVR on presentation - Rate improved; probably infection culprit for RVR   - Now on oral Cardizem  - Continues on Heparin drip; D/w one of the daughters about benefits/risks of NOAC(no personal h/o major bleeding, functional status good, on asa but does have easy bruising)  - Echocardiogram 5/24/2019 with EF 60-65%, Indeterminate diastolic function  - Cardiology consult appreciated    Dementia  - Continue Aricept         IV Access: peripheral  Shoemaker:   Diet: DIET GENERAL;  Code:DNR-CCA  DVT PPX Heparin drip  Disposition  Unclear  Condition Serious/critical borderline - Family considering hospice      Hermelinda Allen MD   5/26/2019 9:12 AM

## 2019-05-26 NOTE — PROGRESS NOTES
Patient diaphoretic, vitals stable, blood sugar not low. Informed hospitalist who said to have RT assess patient for bi-pap. RT assessed patient and said patient was stable on non-re breather and asked for an order for NT suction prn.  Got order from hospitalist.

## 2019-05-26 NOTE — PROGRESS NOTES
[facility] Note    Patient Active Problem List   Diagnosis    Acute systolic CHF    Type 2 diabetes mellitus (HCC)    Acute diastolic CHF (congestive heart failure) (HCC)    Acute respiratory failure (HCC)    Hiatal hernia    Aortic stenosis, severe    S/P TAVR (transcatheter aortic valve replacement)    5/12/16 LEFT IM nail    History of aortic stenosis    Nonrheumatic aortic valve stenosis    Shortness of breath    Acute dyspnea    COPD, mild (HCC)    Centrilobular emphysema (HCC)    Chronic hypoxemic respiratory failure ([address]    Community acquired bacterial pneumonia    Rapid atrial fibrillation (HCC)    Chronic diastolic heart failure (HCC)    Severe sepsis (HCC)    Atrial fibrillation with RVR (HCC)       Past Medical History:   has a past medical history of Back pain, Cancer Skin, CHF (congestive heart failure) ([address] Community acquired bacterial pneumonia, COPD, mild ([address] Diabetes mellitus ([address] Hiatal hernia, Hyperlipidemia, and Hypertension. Past Social History:   reports that she has never smoked. She has never used smokeless tobacco. She reports that she does not drink alcohol or use drugs. Subjective:  Still short of breath. Decreased mental status. Requiring NRBM. Received Lasix yesterday. Review of Systems   Constitutional: Positive for fatigue. Negative for activity change, appetite change, chills, fever and unexpected weight change. HENT: Negative for congestion and facial swelling. Eyes: Negative for photophobia, discharge and redness. Respiratory: Positive for shortness of breath. Negative for cough and chest tightness. Cardiovascular: Negative for chest pain, palpitations and leg swelling. Gastrointestinal: Negative for abdominal distention, abdominal pain, blood in stool, constipation, diarrhea, nausea and vomiting. Endocrine: Negative for cold intolerance, heat intolerance and polyuria. Genitourinary: Negative for decreased urine volume, difficulty urinating, flank pain and hematuria. Musculoskeletal: Negative for joint swelling and neck pain. Neurological: Negative for dizziness, seizures, syncope, speech difficulty, light-headedness and headaches. Hematological: Does not bruise/bleed easily. Psychiatric/Behavioral: Negative for agitation, confusion and hallucinations. Objective:      BP (!) 142/79   Pulse 70   Temp 96.4 °F (35.8 °C) (Temporal)   Resp 20   Ht 4' 11\" (1.499 m)   Wt 112 lb (50.8 kg)   SpO2 92%   BMI 22.62 kg/m²     Wt Readings from Last 3 Encounters:   05/26/19 112 lb (50.8 kg)   02/19/18 117 lb (53.1 kg)   11/21/17 117 lb (53.1 kg)       BP Readings from Last 3 Encounters:   05/26/19 (!) 142/79   02/26/19 139/64   08/21/18 (!) 125/58     Chest- rhonchi b/l  Heart-regular  Abd-soft  Ext- no edema    Labs  Hemoglobin   Date Value Ref Range Status   05/26/2019 9.6 (L) 12.0 - 16.0 g/dL Final     Hematocrit   Date Value Ref Range Status   05/26/2019 29.8 (L) 36.0 - 48.0 % Final     WBC   Date Value Ref Range Status   05/26/2019 15.7 (H) 4.0 - 11.0 K/uL Final     Platelets   Date Value Ref Range Status   05/26/2019 305 135 - 450 K/uL Final     Lab Results   Component Value Date    CREATININE 1.5 (H) 05/26/2019    BUN 68 (H) 05/26/2019     05/26/2019    K 5.0 05/26/2019     05/26/2019    CO2 25 05/26/2019     [address]  Cxray result noted    Assessment/Plan:  1. Acute kidney injury, creatinine on presentation was 1.4. In  08/2017, creatinine was 1.2. DELIO probably due to decreased renal  perfusion in the setting of Pneumonia, relative hypotension, atrial fibrillation  with rapid ventricular response and pneumonia. Non-oliguric. High risk for ATN. Lasix 20mg IV BID today. 2.  Hyponatremia. Better. 3.  Hypertension, no need for aggressive control at this time. 4.  Anemia, follow hemoglobin.   5.  Bilateral pneumonia on antibiotics, management as per Medicine. 6.  Elevated blood glucose,   Management as per Medicine. 7.  A. Fib - per Medicine.    8.  DNR-CCA    Discussed with nurse and family    Brandy Klein MD

## 2019-05-27 NOTE — PROGRESS NOTES
[facility] PROGRESS NOTE    5/27/2019 11:19 AM        Name: Candice Pedroza . Admitted: 5/22/2019  Primary Care Provider: Lenin Baldwin MD (Tel: [phone])    Brief Course:  Admitted with multifocal pneumonia, new afib, medina       CC:sob    Subjective: Pt sleeping. Worsening Pneumonia. Pneumococcal positive.  Family considering Hospice    Reviewed interval ancillary notes    Current Medications    aspirin chewable tablet 324 mg Daily   cefTRIAXone (ROCEPHIN) 2 g IVPB in D5W 50ml minibag Q24H   insulin glargine (LANTUS) injection pen 5 Units Daily   metoprolol tartrate (LOPRESSOR) tablet 25 mg BID   menthol-zinc oxide (CALMOSEPTINE) 0.44-20.6 % ointment BID PRN   diltiazem (CARDIZEM CD) extended release capsule 180 mg Daily   diltiazem 125 mg in dextrose 5 % 125 mL infusion Continuous   repaglinide (PRANDIN) tablet 1 mg TID AC   linagliptin (TRADJENTA) tablet 5 mg Daily   ipratropium-albuterol (DUONEB) nebulizer solution 1 ampule Q4H PRN   ALPRAZolam (XANAX) tablet 0.25 mg BID   mometasone-formoterol (DULERA) 100-5 MCG/ACT inhaler 2 puff BID   ipratropium-albuterol (DUONEB) nebulizer solution 1 ampule Q4H WA   pravastatin (PRAVACHOL) tablet 40 mg Daily   rOPINIRole (REQUIP) tablet 0.5 mg Nightly   ferrous sulfate tablet 325 mg BID   donepezil (ARICEPT) tablet 10 mg Daily   glucose (GLUTOSE) 40 % oral gel 15 g PRN   dextrose 50 % solution 12.5 g PRN   glucagon (rDNA) injection 1 mg PRN   dextrose 5 % solution PRN   insulin lispro (HUMALOG) injection pen 0-12 Units TID WC   insulin lispro (HUMALOG) injection pen 0-6 Units Nightly   heparin 25,000 units in dextrose 5% 250 mL infusion Continuous   predniSONE (DELTASONE) tablet 20 mg Daily   sodium chloride flush 0.9 % injection 10 mL 2 times per day   sodium chloride flush 0.9 % injection 10 mL PRN   ondansetron (ZOFRAN) injection 4 mg Q6H PRN   acetaminophen (TYLENOL) tablet 650 mg Q4H PRN   polyethylene glycol (GLYCOLAX) packet 17 g Daily   pneumococcal 13-valent conjugate (PREVNAR) injection 0.5 mL Once       Objective:  Vitals: BP (!) 133/59   Pulse 103   Temp 97.4 °F (36.3 °C) (Temporal)   Resp 20   Ht 4' 11\" (1.499 m)   Wt 112 lb (50.8 kg) Comment: weight of overlay matress and pump [id number]=336.5-12.5lb  SpO2 90%   BMI 22.62 kg/m²   General appearance: WD/WN with scoliosis 8 y.o. year-old  female who is resting quietly, appears stated age and is cooperative  HEENT: Head: Normocephalic, no lesions, without obvious abnormality. Eye: Normal external eye, conjunctiva, lids cornea, LING. Ears: Normal TM's bilaterally. Normal auditory canals and external ears. Non-tender. Nose: Normal external nose, mucus membranes and septum. Pharynx: Dental Hygiene adequate. Normal buccal mucosa. Normal pharynx. Neck: no adenopathy, no carotid bruit, no JVD, supple, symmetrical, trachea midline and thyroid not enlarged, symmetric, no tenderness/mass/nodules  Lungs: diminished on auscultation bilaterally and some use of accessory muscles. Heart: regular rate and rhythm, S1, S2 normal, no murmur, click, rub or gallop and normal apical impulse  Abdomen: soft, non-tender; bowel sounds normal; no masses,  no organomegaly  Extremities: extremities normal, atraumatic, no cyanosis or edema and Homans sign is negative, no sign of DVT  Skin: Skin color, texture, turgor normal. No rashes or lesions  Neurologic: Mental status: Alert, oriented, thought content appropriate, Cranial nerves II-XII intact; grossly non focal. Gait was not tested.       Labs and Tests:  CBC:   Recent Labs     05/25/19  0617 05/26/19  0401   WBC 16.4* 15.7*   HGB 9.8* 9.6*    305     BMP:    Recent Labs     05/25/19  0617 05/26/19  0401   * 138   K 4.7 5.0   CL 96* 102   CO2 21 25   BUN 66* 68*   CREATININE 1.4* 1.5*   GLUCOSE 374* 309*     Hepatic:   No results for input(s): AST, ALT, ALB, BILITOT, ALKPHOS in the last 72 hours. XR CHEST PORTABLE   Final Result   Increasing right greater than left multifocal airspace disease most   consistent with worsening pneumonia. Suspicion of superimposed congestive   heart failure with pulmonary vascular congestion and new small bilateral   pleural effusions. CT CHEST WO CONTRAST   Final Result   Bilateral multifocal pneumonia. No pleural effusion. XR CHEST STANDARD (2 VW)   Final Result   Stable study. XR CHEST PORTABLE    (Results Pending)         Problem List  Principal Problem:    Community acquired bacterial pneumonia  Active Problems:    Type 2 diabetes mellitus ([address]    Aortic stenosis, severe    S/P TAVR (transcatheter aortic valve replacement)    COPD, mild (HCC)    Chronic hypoxemic respiratory failure (HCC)    Rapid atrial fibrillation (HCC)    Chronic diastolic heart failure (HCC)    Severe sepsis (HCC)    Atrial fibrillation with RVR ([address]  Resolved Problems:    * No resolved hospital problems. *       Assessment & Plan:     Acute hypoxic respiratory failure   - secondary to multifocal pneumonia with severe sepsis POA -  - Increased O2 demands, --  - Strep Pneumo positive   -continue  Rocephin to 2 grams/day, DC Azithromycin  - Prednisone a9  - discussed with daughter.  Continue to see improvement  -     Acute renal failure  - Elevated bun/crea/ initially on dry side, improved with gental hydration.   - Given Laxix x 1 for suspected fluid overloa  - monitor for now     DM with steroid induced hyperglycemia  - Glucose running high;  - continue ISS, home oral hypoglycemic regimen and add low dose Lantus    Afib with RVR on presentation - Rate improved; probably infection culprit for RVR   - Now on oral Cardizem  - Continues on Heparin drip; D/w one of the daughters about benefits/risks of NOAC(no personal h/o major bleeding, functional status good, on asa but does have easy bruising)  - Echocardiogram 5/24/2019 with EF 60-65%, Indeterminate diastolic function  - Cardiology consult appreciated    Dementia  - Continue Aricept         IV Access: peripheral  Shoemaker:   Diet: DIET GENERAL;  Code:DNR-CCA  DVT PPX Heparin drip  Disposition  Unclear  Condition Serious/critical borderline - Family wants to see few days if she improves or not.  If not then Dipak Benavidez MD   5/27/2019 11:19 AM

## 2019-05-27 NOTE — PROGRESS NOTES
Physical Therapy    Treatment Attempt    Attempted to see Swetha Bauer, therapy session held at this time per ongoing lab draw as well as request from RN for management of medication. Will attempt session again as schedule allows.     Art Contreras, PT, DPT   [id number]

## 2019-05-27 NOTE — PROGRESS NOTES
[facility] Note    Patient Active Problem List   Diagnosis    Acute systolic CHF    Type 2 diabetes mellitus (HCC)    Acute diastolic CHF (congestive heart failure) (HCC)    Acute respiratory failure (HCC)    Hiatal hernia    Aortic stenosis, severe    S/P TAVR (transcatheter aortic valve replacement)    5/12/16 LEFT IM nail    History of aortic stenosis    Nonrheumatic aortic valve stenosis    Shortness of breath    Acute dyspnea    COPD, mild (HCC)    Centrilobular emphysema (HCC)    Chronic hypoxemic respiratory failure ([address]    Community acquired bacterial pneumonia    Rapid atrial fibrillation (HCC)    Chronic diastolic heart failure (HCC)    Severe sepsis (HCC)    Atrial fibrillation with RVR (HCC)       Past Medical History:   has a past medical history of Back pain, Cancer Skin, CHF (congestive heart failure) ([address] Community acquired bacterial pneumonia, COPD, mild ([address] Diabetes mellitus ([address] Hiatal hernia, Hyperlipidemia, and Hypertension. Past Social History:   reports that she has never smoked. She has never used smokeless tobacco. She reports that she does not drink alcohol or use drugs. Subjective:    Requiring NRBM. Received Lasix yesterday. Urinating better. Woke up and ate breakfast today. Family at bedside. Review of Systems   Constitutional: Positive for fatigue. Negative for activity change, appetite change, chills, fever and unexpected weight change. HENT: Negative for congestion and facial swelling. Eyes: Negative for photophobia, discharge and redness. Respiratory: Positive for shortness of breath. Negative for cough and chest tightness. Cardiovascular: Negative for chest pain, palpitations and leg swelling. Gastrointestinal: Negative for abdominal distention, abdominal pain, blood in stool, constipation, diarrhea, nausea and vomiting.    Endocrine: Negative for cold intolerance, heat intolerance and polyuria. Genitourinary: Negative for decreased urine volume, difficulty urinating, flank pain and hematuria. Musculoskeletal: Negative for joint swelling and neck pain. Neurological: Negative for dizziness, seizures, syncope, speech difficulty, light-headedness and headaches. Hematological: Does not bruise/bleed easily. Psychiatric/Behavioral: Negative for agitation, confusion and hallucinations. Objective:      BP (!) 133/59   Pulse 103   Temp 97.4 °F (36.3 °C) (Temporal)   Resp 20   Ht 4' 11\" (1.499 m)   Wt 112 lb (50.8 kg) Comment: weight of overlay matress and pump czyzfkagmq=184.5-12.5lb  SpO2 90%   BMI 22.62 kg/m²     Wt Readings from Last 3 Encounters:   05/27/19 112 lb (50.8 kg)   02/19/18 117 lb (53.1 kg)   11/21/17 117 lb (53.1 kg)       BP Readings from Last 3 Encounters:   05/27/19 (!) 133/59   02/26/19 139/64   08/21/18 (!) 125/58     Chest- rhonchi b/l  Heart-regular  Abd-soft  Ext- no edema    Labs  Hemoglobin   Date Value Ref Range Status   05/26/2019 9.6 (L) 12.0 - 16.0 g/dL Final     Hematocrit   Date Value Ref Range Status   05/26/2019 29.8 (L) 36.0 - 48.0 % Final     WBC   Date Value Ref Range Status   05/26/2019 15.7 (H) 4.0 - 11.0 K/uL Final     Platelets   Date Value Ref Range Status   05/26/2019 305 135 - 450 K/uL Final     Lab Results   Component Value Date    CREATININE 1.5 (H) 05/26/2019    BUN 68 (H) 05/26/2019     05/26/2019    K 5.0 05/26/2019     05/26/2019    CO2 25 05/26/2019     [address]-26  Cxray result noted    Labs not available today    Assessment/Plan:  1. Acute kidney injury, creatinine on presentation was 1.4. In  08/2017, creatinine was 1.2. DELIO probably due to decreased renal  perfusion in the setting of Pneumonia, relative hypotension, atrial fibrillation  with rapid ventricular response and pneumonia. Non-oliguric. High risk for ATN. Lasix 20mg IV BID today. Follow chem. Nery for PICC placement. 2.  Hyponatremia. Better. 3.  Hypertension, no need for aggressive control at this time. 4.  Anemia, follow hemoglobin. 5.  Bilateral pneumonia on antibiotics, management as per Medicine. 6.  Elevated blood glucose,   Management as per Medicine. 7.  A. Fib - per Medicine.    8.  DNR-CCA    Discussed with nurse and family    Richard Lopez MD

## 2019-05-27 NOTE — PLAN OF CARE
Problem: Airway Clearance - Ineffective:  Goal: Clear lung sounds  Description  Clear lung sounds  Note:   Rhonci heard throughout corky lungs. O2 sats 92-98% on NRB. Pt desats to 80's when tried to switch to ventimask. IV lasix given. Breathing treatments given. Auditory secretions lessened from this am. Nephrology and cardiology following.

## 2019-05-27 NOTE — PROGRESS NOTES
[employer]   Progress Note  Cardiology    CC: AF, AS/TAVR, Pneumonia, Anemia, Respiratory failure    HPI:  Opens eyes, follows commands--breathing more comfortable with no audible abnormal respiratory effort. NRB mask in place. Rate controled on cardizem at 80-90. PTT pending today. Overall appears much more comfortable today. Medications/Labs all Reviewed    Lab Results   Component Value Date    WBC 15.7 (H) 05/26/2019    HGB 9.6 (L) 05/26/2019    HCT 29.8 (L) 05/26/2019    MCV 94.4 05/26/2019     05/26/2019     Lab Results   Component Value Date    CREATININE 1.5 (H) 05/26/2019    BUN 68 (H) 05/26/2019     05/26/2019    K 5.0 05/26/2019     05/26/2019    CO2 25 05/26/2019     Lab Results   Component Value Date    INR 1.07 05/22/2019    PROTIME 12.2 05/22/2019        Physical Examination:    BP (!) 148/70   Pulse 99   Temp 96 °F (35.6 °C) (Temporal)   Resp 20   Ht 4' 11\" (1.499 m)   Wt 112 lb (50.8 kg) Comment: weight of overlay matress and pump [id number]=468.5-12.5lb  SpO2 95%   BMI 22.62 kg/m²      Respiratory:  · Resp Assessment: Weak respiratory effort-Decreased at bases  · Resp Auscultation: Trace rhochi with decreased BS to auscultation bilaterally   Cardiovascular:  · Auscultation: irregular rate and rhythm, normal F3L5, systolic murmur, rub or gallop  · Palpation:  Nl PMI  · JVP:  normal  · Extremities: No Edema  Abdomen:  · Soft, non-tender  · Normal bowel sounds  Extremities:  ·  No Cyanosis or Clubbing  Neurological/Psychiatric:  · Oriented to time, place, and person  · Non-anxious  Skin:   · Warm and dry    Labs reviewed and discussed with staff and family    Assessment:       Community acquired bacterial pneumonia (5/22/2019) Clinically much improved--Joceline CXR in AM     Type 2 diabetes mellitus ([facility] (3/13/2014)     S/P TAVR (transcatheter aortic valve replacement) (9/17/2015)-Stable valve function  Continue diuresis as tolerated.      COPD, mild ([address] (5/16/2017)     Atrial fibrillation with RVR ([facility] (5/23/2019)-Rate controlled on Cardizem-. Continue heparin at low dose    Anemia--Continue to monitor CBC. Total care time 35 minutes.       Jim Humphrey MD, 5/27/2019 8:01 AM

## 2019-05-27 NOTE — PLAN OF CARE
Problem: Airway Clearance - Ineffective:  Goal: Clear lung sounds  Description  Clear lung sounds  Outcome: Ongoing  Goal: Ability to maintain a clear airway will improve  Description  Ability to maintain a clear airway will improve  Outcome: Ongoing  Intervention: Promote coughing and deep breathing  Description  Promote coughing and deep breathing  Note:   O2 sats 91-92% on 15 L highflow NC. Encouraged coughing and deep breathing. Pt repositioned frequently. Shoemaker cath draining clear yellow urine. IV lasix given and IV antibiotics.

## 2019-05-27 NOTE — PROGRESS NOTES
Multiple attempts to draw daily labs by 3 different lab techs and this RN. Notifying cardiology and nephrology at this time.

## 2019-05-27 NOTE — PROGRESS NOTES
Appears to be resting in bed comfortably. Assessment and med pass complete. Passed meds whole in applesauce. Did not want repositioned at this time, on specialty mattress. Daughter at bedside. Denies needs, call light in reach, bed alarm engaged.

## 2019-05-28 NOTE — PLAN OF CARE
Problem: Falls - Risk of:  Goal: Will remain free from falls  Description  Will remain free from falls  Outcome: Ongoing  Note:   Pt will remain free from falls. Fall precautions in place and alarm engaged. Bedside table and call light within reach. Pt aware to use call light for assistance ambulating. Problem: Risk for Impaired Skin Integrity  Goal: Tissue integrity - skin and mucous membranes  Description  Structural intactness and normal physiological function of skin and  mucous membranes. Outcome: Ongoing  Note:   Pt will remain free from skin breakdown. Pt turned Q2hrs, skin kept clean and dry, and skin assessed per shift or as needed. Problem: Pain:  Goal: Pain level will decrease  Description  Pain level will decrease  Outcome: Ongoing  Goal: Control of acute pain  Description  Control of acute pain  Outcome: Ongoing     Problem: Cardiac:  Goal: Ability to maintain an adequate cardiac output will improve  Description  Ability to maintain an adequate cardiac output will improve  Outcome: Ongoing     Problem: Respiratory:  Goal: Respiratory status will improve  Description  Respiratory status will improve  Outcome: Ongoing  Note:   90% on 9 liters HF NC.  Will continue to monitor and wean     Problem: Airway Clearance - Ineffective:  Goal: Ability to maintain a clear airway will improve  Description  Ability to maintain a clear airway will improve  Outcome: Ongoing

## 2019-05-28 NOTE — PROGRESS NOTES
Physical Therapy  Facility/Department: [address] NURSING  Daily Treatment Note  NAME: Maya Villalobos  : 3/5/1918  MRN: [id number]    Date of Service: 2019    Discharge Recommendations: Maya Villalobos scored a 8/24 on the AM-PAC short mobility form. Current research shows that an AM-PAC score of 17 or less is typically not associated with a discharge to the patient's home setting. Based on the patients AM-PAC score and their current functional mobility deficits, it is recommended that the patient have 3-5 sessions per week of Physical Therapy at d/c to increase the patients independence. Due to pts. Decline in function, dependence for all mobility, and impaired cognitive status, palliative care may be more appropriate. PT Equipment Recommendations  Equipment Needed: No    Patient Diagnosis(es): The primary encounter diagnosis was Rapid atrial fibrillation ([address] Diagnoses of Multifocal pneumonia and Severe sepsis ([address] were also pertinent to this visit. has a past medical history of Back pain, Cancer Skin, CHF (congestive heart failure) ([address] Community acquired bacterial pneumonia, COPD, mild ([address] Diabetes mellitus ([address] Hiatal hernia, Hyperlipidemia, and Hypertension. has a past surgical history that includes Carpal tunnel release; Hysterectomy; skin biopsy; Cataract removal; and Intertrochanteric hip fract. Surgery (Left, 2016). Restrictions  Restrictions/Precautions  Restrictions/Precautions: Fall Risk, Up as Tolerated(high fall risk )  Position Activity Restriction  Other position/activity restrictions: Maya Villalobos is a 80 y.o. female presents to the emergency department with shortness of breath with moist cough x several days. She does wear oxygen chronically been no history of cigarette smoking. She generally wears 2 L but is requiring 4 L on day of admission. She is confused per family, she is generally very alert and oriented.   Daughter states she's been confused for several days. She denies any injury or trauma. Denies fever, states that she is not eating. Dx'd with a-fib and PNA  Subjective   General  Chart Reviewed: Yes  Response To Previous Treatment: Patient with no complaints from previous session. Family / Caregiver Present: Yes(daughter )  Subjective  Subjective: pt. agreeable to PT on this date   General Comment  Comments: pt. supine in bed upon arrival  Pain Screening  Patient Currently in Pain: No  Pain Assessment  Pain Assessment: 0-10  Pain Level: 0  Vital Signs  Patient Currently in Pain: No       Orientation  Orientation  Overall Orientation Status: Impaired  Orientation Level: Oriented to place;Oriented to person  Objective   Bed mobility  Supine to Sit: Dependent/Total(max A x2)  Sit to Supine: Dependent/Total(max A x2)  Scooting: Dependent/Total(max Ax2 )  Comment: HOB raised, attempt to use bedrail but unable to move herself   Transfers  Sit to Stand: Dependent/Total(max Ax2 from commode )  Squat Pivot Transfers: Dependent/Total  Comment: no initation from pt. made, was unable to lift arms high enough to assist with transfer at all, pt. had bowel movement on [facility]   Ambulation  Ambulation?: No(unsafe to attempt at this time )  Stairs/Curb  Stairs?: No     Balance  Posture: Fair  Sitting - Static: Fair(pt. forward flexed and requires mod A to maintain sitting balance with max cuing )  Sitting - Dynamic: Poor  Standing - Static: Poor  Standing - Dynamic: Poor                        Assessment   Body structures, Functions, Activity limitations: Decreased functional mobility ; Decreased endurance;Decreased strength;Decreased balance;Decreased ADL status  Assessment: Patient presents with the above deficits and is not currently at baseline status. Patient would benefit from further skilled PT intervention in the acute setting in order to return to PLOF and maximize independence.  pt. continues to decline with overall function and may be more appropriate for palliative care due to maximal dependence with all function. Prognosis: Fair  Decision Making: Medium Complexity  History: COPD, dementia, declining function   Exam: functional mobility, balance, AMPAC  Patient Education: PT POC, d/c planning   Barriers to Learning: cognitive   REQUIRES PT FOLLOW UP: Yes  Activity Tolerance  Activity Tolerance: Patient limited by fatigue;Patient limited by endurance; Patient limited by cognitive status  Activity Tolerance: pt. on 10 Lo2 throughout session, o2 sats dropped to 86% with transfer to commode and returned to 88 with rest, with transfer back to bed o2 sat dropped to 86 and returned to 93 with 3 min rest lying down with HOB raised      AM-PAC Score  AM-PAC Inpatient Mobility Raw Score : 8  AM-PAC Inpatient T-Scale Score : 28.52  Mobility Inpatient CMS 0-100% Score: 86.62  Mobility Inpatient CMS G-Code Modifier : CM          Goals--no goals met   Short term goals  Time Frame for Short term goals: By D/C  Short term goal 1: Patient to perform bed mobility with supervision   Short term goal 2: Patient to sit<>stand transfer with mod A   Short term goal 3: Patient to SPT with mod A   Short term goal 4: Patient to ambulate 10 ft with RW and mod A   Long term goals  Time Frame for Long term goals : STG=LTG  Patient Goals   Patient goals : to return home     Plan    Plan  Times per week: 3-5  Times per day: Daily  Current Treatment Recommendations: Strengthening, Gait Training, Balance Training, Functional Mobility Training, Endurance Training, Transfer Training  Safety Devices  Type of devices:  All fall risk precautions in place, Nurse notified, Gait belt, Left in bed, Patient at risk for falls, Call light within reach, Bed alarm in place  Restraints  Initially in place: No     Therapy Time   Individual Concurrent Group Co-treatment   Time In 1104         Time Out 1134         Minutes 30         Timed Code Treatment Minutes: Jose Luis 996, [id number]  PT present/supervised treatment. Agrees with above note.     Mya Lo, SPT

## 2019-05-28 NOTE — PROGRESS NOTES
Genitourinary: Negative for decreased urine volume, difficulty urinating, flank pain and hematuria. Musculoskeletal: Negative for joint swelling and neck pain. Neurological: Negative for dizziness, seizures, syncope, speech difficulty, light-headedness and headaches. Hematological: Does not bruise/bleed easily. Psychiatric/Behavioral: Negative for agitation, confusion and hallucinations. Objective:      /71   Pulse 91   Temp 96.9 °F (36.1 °C) (Temporal)   Resp 20   Ht 4' 11\" (1.499 m)   Wt 124 lb 9.6 oz (56.5 kg)   SpO2 98%   BMI 25.17 kg/m²     Wt Readings from Last 3 Encounters:   05/28/19 124 lb 9.6 oz (56.5 kg)   02/19/18 117 lb (53.1 kg)   11/21/17 117 lb (53.1 kg)       BP Readings from Last 3 Encounters:   05/28/19 135/71   02/26/19 139/64   08/21/18 (!) 125/58     Chest- rhonchi b/l  Heart-regular  Abd-soft  Ext- no edema    Labs  Hemoglobin   Date Value Ref Range Status   05/28/2019 10.8 (L) 12.0 - 16.0 g/dL Final     Hematocrit   Date Value Ref Range Status   05/28/2019 33.5 (L) 36.0 - 48.0 % Final     WBC   Date Value Ref Range Status   05/28/2019 23.1 (H) 4.0 - 11.0 K/uL Final     Platelets   Date Value Ref Range Status   05/28/2019 291 135 - 450 K/uL Final     Lab Results   Component Value Date    CREATININE 1.4 (H) 05/28/2019    BUN 61 (H) 05/28/2019     05/28/2019    K 5.3 (H) 05/28/2019    CL 97 (L) 05/28/2019    CO2 28 05/28/2019     Fransisca-26  Cxray result noted    Assessment/Plan:  1. Acute kidney injury, creatinine on presentation was 1.4. In  08/2017, creatinine was 1.2. DELIO probably due to decreased renal  perfusion in the setting of Pneumonia, relative hypotension, atrial fibrillation  with rapid ventricular response and pneumonia. Non-oliguric. High risk for ATN. Follow chem. Okay for PICC placement. Would hold off on Lasix for now, can use PRN. 2.  Hyponatremia. Better. 3.  Hypertension, controlled. 4.  Anemia, follow hemoglobin.   5.  Bilateral pneumonia on antibiotics, management as per Medicine. WBC increased. 6.  Elevated blood glucose,   Management as per Medicine. 7.  A. Fib - per Medicine. 8.  DNR-CCA    Discussed with nurse.      Aroldo Martinez MD

## 2019-05-28 NOTE — PROGRESS NOTES
[facility] PROGRESS NOTE    5/28/2019 7:40 AM        Name: Venus Olguin . Admitted: 5/22/2019  Primary Care Provider: Eneida Cline MD (Tel: [phone])    Brief Course:  Admitted with multifocal pneumonia, new afib, medina       CC:sob    Subjective: Pt sleeping. Worsening Pneumonia. Pneumococcal positive.  Family considering Hospice    Reviewed interval ancillary notes    Current Medications    aspirin chewable tablet 324 mg Daily   lidocaine PF 1 % injection 5 mL Once   sodium chloride flush 0.9 % injection 10 mL 2 times per day   sodium chloride flush 0.9 % injection 10 mL PRN   cefTRIAXone (ROCEPHIN) 2 g IVPB in D5W 50ml minibag Q24H   insulin glargine (LANTUS) injection pen 5 Units Daily   metoprolol tartrate (LOPRESSOR) tablet 25 mg BID   menthol-zinc oxide (CALMOSEPTINE) 0.44-20.6 % ointment BID PRN   diltiazem (CARDIZEM CD) extended release capsule 180 mg Daily   diltiazem 125 mg in dextrose 5 % 125 mL infusion Continuous   repaglinide (PRANDIN) tablet 1 mg TID AC   linagliptin (TRADJENTA) tablet 5 mg Daily   ipratropium-albuterol (DUONEB) nebulizer solution 1 ampule Q4H PRN   ALPRAZolam (XANAX) tablet 0.25 mg BID   mometasone-formoterol (DULERA) 100-5 MCG/ACT inhaler 2 puff BID   ipratropium-albuterol (DUONEB) nebulizer solution 1 ampule Q4H WA   pravastatin (PRAVACHOL) tablet 40 mg Daily   rOPINIRole (REQUIP) tablet 0.5 mg Nightly   ferrous sulfate tablet 325 mg BID   donepezil (ARICEPT) tablet 10 mg Daily   glucose (GLUTOSE) 40 % oral gel 15 g PRN   dextrose 50 % solution 12.5 g PRN   glucagon (rDNA) injection 1 mg PRN   dextrose 5 % solution PRN   insulin lispro (HUMALOG) injection pen 0-12 Units TID WC   insulin lispro (HUMALOG) injection pen 0-6 Units Nightly   [Held by provider] heparin 25,000 units in dextrose 5% 250 mL infusion Continuous   ondansetron (ZOFRAN) injection 4 mg Q6H PRN   acetaminophen PORTABLE   Final Result   Increasing right pleural effusion as compared to prior. Persistent adjacent   airspace disease. Left pleural effusion left-sided airspace disease is not significantly   changed. XR CHEST PORTABLE   Final Result   Increasing right greater than left multifocal airspace disease most   consistent with worsening pneumonia. Suspicion of superimposed congestive   heart failure with pulmonary vascular congestion and new small bilateral   pleural effusions. CT CHEST WO CONTRAST   Final Result   Bilateral multifocal pneumonia. No pleural effusion. XR CHEST STANDARD (2 VW)   Final Result   Stable study. Problem List  Principal Problem:    Community acquired bacterial pneumonia  Active Problems:    Type 2 diabetes mellitus ([facility] [address]    Aortic stenosis, severe    S/P TAVR (transcatheter aortic valve replacement)    COPD, mild (HCC)    Chronic hypoxemic respiratory failure (HCC)    Rapid atrial fibrillation (HCC)    Chronic diastolic heart failure (HCC)    Severe sepsis (HCC)    Atrial fibrillation with RVR ([facility] [address]  Resolved Problems:    * No resolved hospital problems. *       Assessment & Plan:     Acute hypoxic respiratory failure   - secondary to multifocal pneumonia with severe sepsis POA -  - Increased O2 demands, --  - wbc is worse today   -   -continue  Rocephin to 2 grams/day, DC Azithromycin  - Prednisone a9  - discussed with daughter.  Continue to see improvement  -     Acute renal failure  - Elevated bun/crea/ initially on dry side, improved with gental hydration.   - Given Laxix x 1 for suspected fluid overloa  - monitor for now     DM with steroid induced hyperglycemia  - Glucose running high;  - continue ISS, home oral hypoglycemic regimen and add low dose Lantus    Afib with RVR on presentation - Rate improved; probably infection culprit for RVR   - Now on oral Cardizem  - Continues on Heparin drip; D/w one of the daughters about benefits/risks of NOAC(no personal h/o major bleeding, functional status good, on asa but does have easy bruising)  - Echocardiogram 5/24/2019 with EF 60-65%, Indeterminate diastolic function  - Cardiology consult appreciated    Dementia  - Continue Aricept         IV Access: peripheral  Shoemaker:   Diet: DIET GENERAL;  Code:DNR-CCA  DVT PPX Heparin drip  Disposition  Unclear  Condition Serious/critical borderline - Family wants to see few days if she improves or not.  If not then Saurav Henson MD   5/28/2019 7:40 AM

## 2019-05-28 NOTE — PROGRESS NOTES
· Mouth/Throat: Oropharynx is clear and moist.   · Eyes: Conjunctivae normal. EOM are normal.   · Neck: Neck supple. No rigidity. No JVD present. · Cardiovascular: Tachycardic, Irregular rhythm, S1&S2. Systolic murmur   · Pulmonary/Chest: Bilateral coarse BS. · Abdominal: Soft. Bowel sounds present. No tenderness. · Musculoskeletal: No tenderness. No edema    · Lymphadenopathy: Has no cervical adenopathy. · Neurological: Alert and oriented. Hard of hearing   · Skin: Skin is warm and dry. No rash noted. · Psychiatric: Has a normal behavior     Labs:  Reviewed. Recent Labs     05/26/19  0401 05/28/19  0510    138   K 5.0 5.3*    97*   CO2 25 28   BUN 68* 61*   CREATININE 1.5* 1.4*     Recent Labs     05/26/19  0401 05/28/19  0510   WBC 15.7* 23.1*   HGB 9.6* 10.8*   HCT 29.8* 33.5*   MCV 94.4 95.8    291     Lab Results   Component Value Date    TROPONINI <0.01 05/24/2019     CrCl cannot be calculated (Unknown ideal weight.).    Lab Results   Component Value Date     03/13/2014     Lab Results   Component Value Date    PROTIME 12.2 05/22/2019    PROTIME 10.4 01/24/2017    PROTIME 11.0 05/13/2016    INR 1.07 05/22/2019    INR 0.91 01/24/2017    INR 0.97 05/13/2016     Lab Results   Component Value Date    CHOL 116 03/14/2014    HDL 56 03/14/2014    TRIG 68 03/14/2014       Scheduled Meds:   aspirin  324 mg Oral Daily    lidocaine 1 % injection  5 mL Intradermal Once    sodium chloride flush  10 mL Intravenous 2 times per day    cefTRIAXone (ROCEPHIN) IV  2 g Intravenous Q24H    insulin glargine  5 Units Subcutaneous Daily    metoprolol tartrate  25 mg Oral BID    diltiazem  180 mg Oral Daily    repaglinide  1 mg Oral TID AC    linagliptin  5 mg Oral Daily    ALPRAZolam  0.25 mg Oral BID    mometasone-formoterol  2 puff Inhalation BID    ipratropium-albuterol  1 ampule Inhalation Q4H WA    pravastatin  40 mg Oral Daily    rOPINIRole  0.5 mg Oral Nightly    ferrous sulfate  325 mg Oral BID    donepezil  10 mg Oral Daily    insulin lispro  0-12 Units Subcutaneous TID WC    insulin lispro  0-6 Units Subcutaneous Nightly    polyethylene glycol  17 g Oral Daily    pneumococcal 13-valent conjugate  0.5 mL Intramuscular Once     Continuous Infusions:   diltiazem (CARDIZEM) 125 mg in dextrose 5% 125 mL infusion Stopped (05/24/19 0900)    dextrose      [Held by provider] heparin (porcine) 8 Units/kg/hr (05/26/19 1011)     PRN Meds:sodium chloride flush, menthol-zinc oxide, ipratropium-albuterol, glucose, dextrose, glucagon (rDNA), dextrose, ondansetron, acetaminophen     Diagnostic and imaging results reviewed.      Patient Active Problem List    Diagnosis Date Noted    Acute dyspnea      Priority: High    5/12/16 LEFT IM nail      Priority: High    Severe sepsis ([address] 05/23/2019    Atrial fibrillation with RVR ([address] 05/23/2019    Community acquired bacterial pneumonia 05/22/2019    Rapid atrial fibrillation ([address] 05/22/2019    Chronic diastolic heart failure ([address] 05/22/2019    COPD, mild ([address] 05/16/2017    Centrilobular emphysema ([address] 05/16/2017    Chronic hypoxemic respiratory failure ([address] 05/16/2017    Shortness of breath 09/28/2016    Nonrheumatic aortic valve stenosis     History of aortic stenosis     S/P TAVR (transcatheter aortic valve replacement) 09/17/2015    Aortic stenosis, severe 99/50/7013    Acute systolic CHF 41/63/9555    Type 2 diabetes mellitus (HCC) 03/13/2014    Acute diastolic CHF (congestive heart failure) ([address] 03/13/2014    Acute respiratory failure ([address] 03/13/2014    Hiatal hernia 03/13/2014      Active Hospital Problems    Diagnosis Date Noted    Severe sepsis ([address] [A41.9, R65.20] 05/23/2019    Atrial fibrillation with RVR ([address] [I48.91] 05/23/2019    Community acquired bacterial pneumonia [J15.9] 05/22/2019    Rapid atrial fibrillation ([address] [I48.91] 05/22/2019    Chronic diastolic [facility] failure ([facility] [I50.32] 05/22/2019    Chronic hypoxemic respiratory failure (HCC) [J96.11] 05/16/2017    COPD, mild ([facility] [J44.9] 05/16/2017    S/P TAVR (transcatheter aortic valve replacement) [Z95.2] 09/17/2015    Aortic stenosis, severe [I35.0] 03/12/2015    Type 2 diabetes mellitus ([facility] [address] [E11.9] 03/13/2014     Echo:    -Normal global systolic function with an ejection fraction estimated at   60-65%.  -No obvious regional wall motion abnormalities noted.   -The bioprosthetic TAVR artificial aortic valve appears well seated with a   maximum velocity of 2.13m/s and a mean gradient of 10 mmHg. There is mild   perivalvular aortic regurgitation.   -Thickened mitral valve without evidence of stenosis. Moderate mitral   annular calcification is present. There is moderate mitral regurgitation.   -There is moderate tricuspid regurgitation with a RVSP estimation of 51   mmHg.   -Indeterminate diastolic function. Assessment:   - Persistent atrial fibrillation:    Rate controlled. Tachycardia due to pneumonia and respiratory issues. Recommend changing to low dose DOACs when renal failure is improved. High [id number] score and high risk for stroke and thromboembolism. - S/p TAVR    Stable. Mild perivalvular regurgitation noted. - Multifocal pneumonia:    On antibiotic therapy. - Hyponatremia/DELIO:    Received NS. Nephrology following. Multiple medical conditions with risk of decompensation. Palliative care is reasonable. No further EP Recommendations. Will sign off. NOTE: This report was transcribed using voice recognition software. Every effort was made to ensure accuracy, however, inadvertent computerized transcription errors may be present.      Hue Carolina MD, MPH  [place] [id number]   Office: [phone]

## 2019-05-28 NOTE — PROGRESS NOTES
Pt awake in bed. VSS and pt repositioned. Denies any needs. Call light in reach and bed alarm engaged.

## 2019-05-28 NOTE — PLAN OF CARE
Problem: Airway Clearance - Ineffective:  Goal: Clear lung sounds  Description  Clear lung sounds  5/27/2019 2300 by Kemar Villeda RN  Note:   See flow sheet for respiratory details. Pt on high flow nasal cannula, SPO2 > 90%. Will continue to assess respiratory/O2 status. Pt receiving breathing tx. Problem: Falls - Risk of:  Goal: Will remain free from falls  Description  Will remain free from falls  Note:   Pt has remained free from any falls so far this shift. Bed alarm activated. Bed in lowest and locked position. Belongings within reach. Will continue to monitor. Call light within reach. Problem: Risk for Impaired Skin Integrity  Goal: Tissue integrity - skin and mucous membranes  Description  Structural intactness and normal physiological function of skin and  mucous membranes. Note:   Prevenativte dressings in place to prevent skin breakdown. Q2HR check and change, Q2HR turns. Specialty mattress in place. Will continue to assess skin. Call light within reach. Problem: Pain:  Description  Pain management should include both nonpharmacologic and pharmacologic interventions. Goal: Pain level will decrease  Description  Pain level will decrease  Note:   Pt has denied having any pain so far this shift. Will continue to assess pain level. Call light within reach.

## 2019-05-28 NOTE — PROGRESS NOTES
Pt awake in bed watching tv. VSS and pt denies any needs. Call light in reach and bed alarm engaged. Daughter at bedside.

## 2019-05-28 NOTE — PROGRESS NOTES
Occupational Therapy  Facility/Department: [address] NURSING  Daily Treatment Note  NAME: Dolores Mcarthur  : 3/5/1918  MRN: [id number]    Date of Service: 2019    Discharge Recommendations: Dolores Mcarthur scored a 10/24 on the AM-PAC ADL Inpatient form. Current research shows that an AM-PAC score of 17 or less is typically not associated with a discharge to the patient's home setting. Based on the patients AM-PAC score and their current ADL deficits, it is recommended that the patient have 3-5 sessions per week of Occupational Therapy at d/c to increase the patients independence. Due to patient's decline in cognition and decreased participation with ADL/mobility, palliative care may be appropriate to consider. OT Equipment Recommendations  Equipment Needed: No    Assessment   Performance deficits / Impairments: Decreased functional mobility ; Decreased ADL status; Decreased ROM; Decreased strength;Decreased cognition;Decreased endurance;Decreased balance  Assessment: Pt is not at baseline level of function and will benefit from skilled OT in order to address the above limitations. Treatment Diagnosis: Decreased functional mobility, ADL status, ROM, strength, cognition, functional activity tolerance, balance related to PNA and a-fib  Prognosis: Good  History: mod I in ADLs, lives with daughter, h/o CHF, COPD, HTN  Exam: six-clicks, balance, ROM, vitals  Assistance / Modification: max A of 2, dep for socks  Patient Education: Role of OT, OT eval, pursed lip breathing, POC, d/c recommendations, benefits of sitting up in chair  Barriers to Learning: hearing, cognition  REQUIRES OT FOLLOW UP: Yes  Activity Tolerance  Activity Tolerance: Patient limited by fatigue;Treatment limited secondary to medical complications (free text)  Activity Tolerance: Initially ~45 bpm HR seated EOB with desat to 86% O2, increased to 88% with cues for pursed lip breathing.  Pt at 89% O2 sat s/p transfers and BM at [facility], with cues for pursed lip breathing to return to low 90s; HR ~89bpm. Pt tends to breath through mouth, requiring min-mod cues to breathe via NC  Safety Devices  Safety Devices in place: Yes  Type of devices: All fall risk precautions in place; Patient at risk for falls;Call light within reach;Nurse notified;Gait belt;Bed alarm in place; Left in bed         Patient Diagnosis(es): The primary encounter diagnosis was Rapid atrial fibrillation ([address] Diagnoses of Multifocal pneumonia and Severe sepsis ([address] were also pertinent to this visit. has a past medical history of Back pain, Cancer Skin, CHF (congestive heart failure) ([address] Community acquired bacterial pneumonia, COPD, mild ([address] Diabetes mellitus ([address] Hiatal hernia, Hyperlipidemia, and Hypertension. has a past surgical history that includes Carpal tunnel release; Hysterectomy; skin biopsy; Cataract removal; and Intertrochanteric hip fract. Surgery (Left, 5/12/2016). Restrictions  Restrictions/Precautions  Restrictions/Precautions: Fall Risk, Up as Tolerated(high fall risk )  Position Activity Restriction  Other position/activity restrictions: Kelley Choe is a 80 y.o. female presents to the emergency department with shortness of breath with moist cough x several days. She does wear oxygen chronically been no history of cigarette smoking. She generally wears 2 L but is requiring 4 L on day of admission. She is confused per family, she is generally very alert and oriented. Daughter states she's been confused for several days. She denies any injury or trauma. Denies fever, states that she is not eating. Dx'd with a-fib and PNA     Subjective   General  Chart Reviewed: Yes  Additional Pertinent Hx: CHF, COPD, HTN, CTR, L hip fx sx 2016  Family / Caregiver Present: Yes(daughter at beginning of session)  Diagnosis: PNA, a-fib  Subjective  Subjective: Pt supine in bed, daughter present; pt requesting to use bedpan.  Agreeable to use BSC with assist of therapy for tx  General Comment  Comments: RN, jimmie Rios minimal activity only secondary to elevated HR. Vitals monitored throughout evaluation. RN increased supplemental O2 from 3L O2 to 5L O2 prior to activity. O2 sats 91%, -125 supine in bed. Pt assisted to EOB, HR in similar range, O2 sats increased to 94-95%. During partial stand, HR increased to 140s and decreased to 115-125 once seated EOB. During and after transfer to chair, HR remained in 115-125. O2 sats decreased to mid 80s% and increased to 90-91% once in chair. Pain Assessment  Pain Assessment: 0-10  Pain Type: Acute pain  Pain Location: Back  Pain Orientation: Right  Pain Descriptors: Aching  Pain Frequency: Intermittent  Vital Signs  Patient Currently in Pain: No     Orientation  Orientation  Overall Orientation Status: Impaired  Orientation Level: Oriented to place;Oriented to person;Disoriented to time;Disoriented to situation     Objective    ADL  LE Dressing: Dependent/Total(socks)  Toileting: Dependent/Total(BM at [facility])        Balance  Sitting Balance: Moderate assistance(min-mod A, pt with R side and forward lean)  Standing Balance: Dependent/Total(max x2)  Standing Balance  Time: 3 seconds SPT x2, partial stance x3 for ~8 seconds each with pericare  Toilet Transfers  Toilet - Technique: Squat pivot  Equipment Used: Standard bedside commode  Toilet Transfer: Dependent/Total;2 Person assistance  Toilet Transfers Comments: Max x2; EOB <> BSC  Bed mobility  Supine to Sit: Dependent/Total(max A x2)  Sit to Supine: Dependent/Total(max A x2)  Scooting: Dependent/Total(max Ax2 )  Comment: Attempted to use bedrail but unable to reach without assist  Transfers  Stand Pivot Transfers: Dependent/Total(max x2)  Sit to stand: Dependent/Total(max x2; from EOB)  Stand to sit: Dependent/Total(max x2; to EOB)         Cognition  Overall Cognitive Status: Exceptions  Arousal/Alertness: Delayed responses to stimuli  Following Commands:  Follows one step commands with repetition; Follows one step commands with increased time  Attention Span: Difficulty attending to directions  Memory: Decreased recall of biographical Information;Decreased recall of recent events;Decreased short term memory  Safety Judgement: Decreased awareness of need for assistance  Problem Solving: Decreased awareness of errors  Insights: Decreased awareness of deficits  Initiation: Requires cues for some  Sequencing: Requires cues for some      Plan   Plan  Times per week: 3-5x  Times per day: Daily  Specific instructions for Next Treatment: cotx  Current Treatment Recommendations: Strengthening, ROM, Balance Training, Functional Mobility Training, Endurance Training, Cognitive Reorientation, Self-Care / ADL, Patient/Caregiver Education & Training, Safety Education & Training, Equipment Evaluation, Education, & procurement  G-Code     OutComes Score                                                  AM-PAC Score        AM-PAC Inpatient Daily Activity Raw Score: 10  AM-PAC Inpatient ADL T-Scale Score : 27.31  ADL Inpatient CMS 0-100% Score: 74.7  ADL Inpatient CMS G-Code Modifier : CL    Goals  Short term goals  Time Frame for Short term goals: Discharge  Short term goal 1: Supervision for UB ADL seated EOB. - not addressed 5/28  Short term goal 2: Mod A for LB ADLs. - not addressed 5/28  Short term goal 3: Mod A for functional transfers. - max x2 5/28  Short term goal 4: Mod A for functional mobility. - not addressed 5/28  Short term goal 5: Increase standing tolerance up to 2-3 min for ADL task.  - not addressed 5/28  Long term goals  Time Frame for Long term goals : STG=LTG       Therapy Time   Individual Concurrent Group Co-treatment   Time In       1027   Time Out       1056   Minutes       29      Timed Code Treatment Minutes:  29 Minutes    Total Treatment Minutes:  29 minutes    NOEL Lares OTR/L [id number]    Levy Lechuga OT

## 2019-05-28 NOTE — CARE COORDINATION
CM notified PT that updated evaluation and therapy notes are needed from service for precertification to SNF on DC. CM advised admissions/ [facility] that updated therapy notes are available.   Joy Maciel BSN, CCM, RN  [facility]  [phone]

## 2019-05-29 NOTE — FLOWSHEET NOTE
Shift assessment complete. VSS. Pt. Is alert and able to answer questions from this RN at this time. Pt. Has no complaints of SOB or pain at this time. Pt. Able to eat independently after set up and no noted coughing after eating. POC will be discussed with family on visit today. No further needs expressed at this time. Bed alarm engaged. Call light and bedside table within reach. 05/29/19 0900   Vital Signs   Temp 97 °F (36.1 °C)   Temp Source Temporal   Pulse 88   Heart Rate Source Brachial   Resp 16   BP (!) 156/80   BP Location Left upper arm   MAP (mmHg) 106   Patient Position Semi fowlers   Level of Consciousness 0   MEWS Score 1   Patient Currently in Pain Denies   Pain Assessment   Pain Assessment 0-10   Pain Level 0   Non-Pharmaceutical Pain Intervention(s) Declines   Response to Pain Intervention Patient Satisfied   Oxygen Therapy   SpO2 90 %   Pulse Oximeter Device Mode Intermittent   Pulse Oximeter Device Location Finger   O2 Device High flow nasal cannula   O2 Flow Rate (L/min) 10 L/min   Will continue to monitor.  No Novoa

## 2019-05-29 NOTE — PROGRESS NOTES
Spoke with Carissa Reina RN earlier today and she states this patient no longer needs a PICC. Consult complete.

## 2019-05-29 NOTE — PROGRESS NOTES
effusion as compared to prior. Persistent adjacent   airspace disease. Left pleural effusion left-sided airspace disease is not significantly   changed. XR CHEST PORTABLE   Final Result   Increasing right greater than left multifocal airspace disease most   consistent with worsening pneumonia. Suspicion of superimposed congestive   heart failure with pulmonary vascular congestion and new small bilateral   pleural effusions. CT CHEST WO CONTRAST   Final Result   Bilateral multifocal pneumonia. No pleural effusion. XR CHEST STANDARD (2 VW)   Final Result   Stable study. Problem List  Principal Problem:    Community acquired bacterial pneumonia  Active Problems:    Type 2 diabetes mellitus ([address]    Aortic stenosis, severe    S/P TAVR (transcatheter aortic valve replacement)    COPD, mild (HCC)    Chronic hypoxemic respiratory failure (HCC)    Rapid atrial fibrillation (HCC)    Chronic diastolic heart failure (HCC)    Severe sepsis (HCC)    Atrial fibrillation with RVR ([address]  Resolved Problems:    * No resolved hospital problems. *       Assessment & Plan:     Acute hypoxic respiratory failure   - secondary to multifocal pneumonia with severe sepsis POA -  Still remains on 9l nc. - we have been trying to wean slowly  - discussed with daughter in details about goal of care. Remains DNRCCA.  But she feels pt is doing bettter day by day so does not want hospice  - we will continues to wean oxygen as tolerated   -wbc is improved to 16 today from, 23   -continue  Rocephin to 2 grams/day, DC Azithromycin  - Prednisone     Acute renal failure  - Elevated bun/crea/ initially on dry side, improved with gental hydration.   - Given Laxix x 1 for suspected fluid overloa  - monitor for now     DM with steroid induced hyperglycemia  - Glucose running high;  - continue ISS, home oral hypoglycemic regimen and add low dose Lantus    Afib with RVR on presentation - Rate improved; probably infection culprit for RVR   - Now on oral Cardizem  - Continues on Heparin drip; D/w one of the daughters about benefits/risks of NOAC(no personal h/o major bleeding, functional status good, on asa but does have easy bruising)  - Echocardiogram 5/24/2019 with EF 60-65%, Indeterminate diastolic function  - Cardiology consult appreciated    Dementia  - Continue Aricept         IV Access: peripheral  Shoemaker:   Diet: DIET GENERAL; Low Potassium  Code:DNR-CCA  DVT PPX Heparin drip  Disposition  Unclear  Condition Serious/critical borderline - Family wants to see few days if she improves or not.  If not then Analisa Carpenter MD   5/29/2019 8:42 AM

## 2019-05-29 NOTE — PROGRESS NOTES
[facility] Note    Patient Active Problem List   Diagnosis    Acute systolic CHF    Type 2 diabetes mellitus (HCC)    Acute diastolic CHF (congestive heart failure) (HCC)    Acute respiratory failure (HCC)    Hiatal hernia    Aortic stenosis, severe    S/P TAVR (transcatheter aortic valve replacement)    5/12/16 LEFT IM nail    History of aortic stenosis    Nonrheumatic aortic valve stenosis    Shortness of breath    Acute dyspnea    COPD, mild (HCC)    Centrilobular emphysema (HCC)    Chronic hypoxemic respiratory failure ([address]    Community acquired bacterial pneumonia    Rapid atrial fibrillation (HCC)    Chronic diastolic heart failure (HCC)    Severe sepsis (HCC)    Atrial fibrillation with RVR (HCC)       Past Medical History:   has a past medical history of Back pain, Cancer Skin, CHF (congestive heart failure) ([address] Community acquired bacterial pneumonia, COPD, mild ([address] Diabetes mellitus ([address] Hiatal hernia, Hyperlipidemia, and Hypertension. Past Social History:   reports that she has never smoked. She has never used smokeless tobacco. She reports that she does not drink alcohol or use drugs. Subjective:    Off NRBM. Received Lasix yesterday. Urinating well. Feels tired. Review of Systems   Constitutional: Positive for fatigue. Negative for activity change, appetite change, chills, fever and unexpected weight change. HENT: Negative for congestion and facial swelling. Eyes: Negative for photophobia, discharge and redness. Respiratory: Positive for shortness of breath. Negative for cough and chest tightness. Cardiovascular: Negative for chest pain, palpitations and leg swelling. Gastrointestinal: Negative for abdominal distention, abdominal pain, blood in stool, constipation, diarrhea, nausea and vomiting. Endocrine: Negative for cold intolerance, heat intolerance and polyuria.    Genitourinary: Negative for decreased urine volume, difficulty urinating, flank pain and hematuria. Musculoskeletal: Negative for joint swelling and neck pain. Neurological: Negative for dizziness, seizures, syncope, speech difficulty, light-headedness and headaches. Hematological: Does not bruise/bleed easily. Psychiatric/Behavioral: Negative for agitation, confusion and hallucinations. Objective:      BP (!) 156/80   Pulse 88   Temp 97 °F (36.1 °C) (Temporal)   Resp 16   Ht 4' 11\" (1.499 m)   Wt 123 lb 14.4 oz (56.2 kg)   SpO2 90%   BMI 25.02 kg/m²     Wt Readings from Last 3 Encounters:   05/29/19 123 lb 14.4 oz (56.2 kg)   02/19/18 117 lb (53.1 kg)   11/21/17 117 lb (53.1 kg)       BP Readings from Last 3 Encounters:   05/29/19 (!) 156/80   02/26/19 139/64   08/21/18 (!) 125/58     Chest- rhonchi b/l  Heart-regular  Abd-soft  Ext- no edema    Labs  Hemoglobin   Date Value Ref Range Status   05/29/2019 11.6 (L) 12.0 - 16.0 g/dL Final     Hematocrit   Date Value Ref Range Status   05/29/2019 36.3 36.0 - 48.0 % Final     WBC   Date Value Ref Range Status   05/29/2019 16.6 (H) 4.0 - 11.0 K/uL Final     Platelets   Date Value Ref Range Status   05/29/2019 365 135 - 450 K/uL Final     Lab Results   Component Value Date    CREATININE 1.7 (H) 05/29/2019    BUN 61 (H) 05/29/2019     05/29/2019    K 5.1 05/29/2019     05/29/2019    CO2 27 05/29/2019     Fransisca-26  Cxray result noted    Assessment/Plan:  1. Acute kidney injury, creatinine on presentation was 1.4. In  08/2017, creatinine was 1.2. DELIO probably due to decreased renal  perfusion in the setting of Pneumonia, relative hypotension, atrial fibrillation  with rapid ventricular response and pneumonia. Non-oliguric. High risk for ATN. Crea higher today. Would hold off on Lasix for today, can use PRN. 2.  Hyponatremia. Better. 3.  Hypertension, controlled. 4.  Anemia, follow hemoglobin.   5.  Bilateral pneumonia on antibiotics, management as per Medicine. WBC decreased. 6.  Elevated blood glucose,   Management as per Medicine. 7.  A. Fib - per Medicine.    8.  DNR-CCA    Ping Pichardo MD

## 2019-05-30 NOTE — DISCHARGE INSTR - COC
Continuity of Care Form    Patient Name: Fabiana Leong   :  3/5/1918  MRN:  [id number]    Admit date:  2019  Discharge date:  ***    Code Status Order: DNR-CCA   Advance Directives:   [facility] Documentation     Date/Time Healthcare Directive Type of Healthcare Directive Copy in [address] [facility] [address] Agent's Name Healthcare Agent's Phone Number    19 2224  No, patient does not have an advance directive for healthcare treatment -- -- -- -- --          Admitting Physician:  Gustavo Underwood MD  PCP: Jamia Falcon MD    Discharging Nurse: [facility] Unit/Room#: 3AN-3312/3312-01  Discharging Unit Phone Number: ***    Emergency Contact:   Extended Emergency Contact Information  Primary Emergency Contact: Pattie Rodriguez  Address: [address] [place] [address], [facility] [place] [facility]  Home Phone: [phone]  Mobile Phone: [phone]  Relation: Child    Past Surgical History:  Past Surgical History:   Procedure Laterality Date    CARPAL TUNNEL RELEASE      bilat hands    CATARACT REMOVAL      bilateral    HYSTERECTOMY      partial    INTERTROCHANTERIC HIP FRACTURE SURGERY Left 2016    OPEN REDUCTION INTERNAL FIXATION OF INTERTROCHANTERIC    SKIN BIOPSY      from nose - basal cell carcinoma        Immunization History:   Immunization History   Administered Date(s) Administered    Influenza, High Dose (Fluzone 65 yrs and older) 09/15/2017       Active Problems:  Patient Active Problem List   Diagnosis Code    Acute systolic CHF U03.04    Type 2 diabetes mellitus ([facility] E11.9    Acute diastolic CHF (congestive heart failure) (HCC) I50.31    Acute respiratory failure ([facility]) J96.00    Hiatal hernia K44.9    Aortic stenosis, severe I35.0    S/P TAVR (transcatheter aortic valve replacement) Z95.2    16 LEFT IM nail S72.142A    History of aortic stenosis Z86.79    Nonrheumatic aortic valve stenosis I35.0    Shortness of breath R06.02    Acute dyspnea R06.00    COPD, mild ([facility]) J44.9    Centrilobular emphysema ([facility]) J43.2    Chronic hypoxemic respiratory failure ([facility]) J96.11    Community acquired bacterial pneumonia J15.9    Rapid atrial fibrillation ([facility]) I48.91    Chronic diastolic heart failure ([facility]) I50.32    Severe sepsis ([facility]) A41.9, R65.20    Atrial fibrillation with RVR ([facility]) I48.91       Isolation/Infection:   Isolation          No Isolation            Nurse Assessment:  Last Vital Signs: /66   Pulse 83   Temp 97 °F (36.1 °C) (Temporal)   Resp 14   Ht 4' 11\" (1.499 m)   Wt 124 lb 3.2 oz (56.3 kg)   SpO2 92%   BMI 25.09 kg/m²     Last documented pain score (0-10 scale): Pain Level: 0  Last Weight:   Wt Readings from Last 1 Encounters:   05/30/19 124 lb 3.2 oz (56.3 kg)     Mental Status:  {IP PT MENTAL STATUS:20030}    IV Access:  { ANA IV ACCESS:684507688}    Nursing Mobility/ADLs:  Walking   {[facility] DME PGRJ:343478855}  Transfer  {[facility] DME WWHP:857214414}  Bathing  {[facility] DME WUDF:883201766}  Dressing  {[facility] DME QKRP:853448857}  Toileting  {[facility] DME QSQI:144059312}  Feeding  {[facility] DME SGRX:856310663}  Med Admin  {[facility] DME YUEV:577344172}  Med Delivery   {[facility] MED Delivery:976841753}    Wound Care Documentation and Therapy:  Incision 05/12/16 Leg Lateral;Lower; Outer (Active)   Number of days: 1112       Incision 05/12/16 Hip Left (Active)   Number of days: 5776        Elimination:  Continence:   · Bowel: {YES / PC:07931}  · Bladder: {YES / SP:54450}  Urinary Catheter: {Urinary Catheter:303184988}   Colostomy/Ileostomy/Ileal Conduit: {YES / QF:23221}       Date of Last BM: ***    Intake/Output Summary (Last 24 hours) at 5/30/2019 1720  Last data filed at 5/30/2019 0900  Gross per 24 hour   Intake 240 ml   Output 160 ml   Net 80 ml     I/O last 3 completed shifts:   In: 340 [P.O.:340]  Out: 160 [Urine:160]    Safety Concerns:     [address] Safety Concerns:100674570}    Impairments/Disabilities:      [address]

## 2019-05-30 NOTE — PROGRESS NOTES
Nutrition Assessment (Low Risk)    Type and Reason for Visit: Initial(LOS)    Nutrition Recommendations:   Comfort measures per Hospice     Nutrition Assessment:  Patient assessed for nutritional risk. Deemed to be at low risk at this time. Will continue to monitor for changes in status. Plans for d/c to in hospice 5/30. Will offer supportive care & monitor for further changes in plan of care.     Malnutrition Assessment:  · Malnutrition Status:  NA    Nutrition Risk Level   Risk Level: Low(d/t transfer to hospice care)    Nutrition Diagnosis: NA    Nutrition Intervention:  Food and/or Delivery: Continue current diet  Nutrition Education/Counseling/Coordination of Care:  Continued Inpatient Monitoring, Education Not Indicated      Electronically signed by Natanael Cabrera RD, EPIFANIO on 5/30/19 at 10:46 AM    Contact Number: [phone]

## 2019-05-30 NOTE — PROGRESS NOTES
Palliative Care:     Daughter at bedside. Patient is now on 12 liters of oxygen Short of breath with mild exertion. She has not made progress in hospital. Discussed with daughter option for hospice care. Hospice benefit and philosophy explained. Hospice choices reviewed. Daughter agrees to [address], she is interested in [facility] inpatient unit--placed bed on hold. Referral has been called and faxed. Contacted patient's  from [facility] to see.

## 2019-05-30 NOTE — PROGRESS NOTES
Occupational/Physical Therapy  [facility]    Chart reviewed. Per palliative care, pt will be going to hospice. Daughter interested in [address]. Will therefore d/c pt from OT/PT caseload at this time.     Thanks,  Radha Senior, MOT, OTR/L [id number]  aJnusz Will, PT, DPT [id number]

## 2019-05-30 NOTE — PROGRESS NOTES
[facility] PROGRESS NOTE    5/30/2019 10:33 AM        Name: Louisa Bowles . Admitted: 5/22/2019  Primary Care Provider: Jeb Palacios MD (Tel: [phone])    Brief Course:  Admitted with multifocal pneumonia, new afib, medina       CC:sob    Subjective: Pt sleeping. Worsening Pneumonia. Pneumococcal positive.  Family considering Hospice    Reviewed interval ancillary notes    Current Medications    aspirin chewable tablet 324 mg Daily   lidocaine PF 1 % injection 5 mL Once   sodium chloride flush 0.9 % injection 10 mL 2 times per day   sodium chloride flush 0.9 % injection 10 mL PRN   cefTRIAXone (ROCEPHIN) 2 g IVPB in D5W 50ml minibag Q24H   insulin glargine (LANTUS) injection pen 5 Units Daily   metoprolol tartrate (LOPRESSOR) tablet 25 mg BID   menthol-zinc oxide (CALMOSEPTINE) 0.44-20.6 % ointment BID PRN   diltiazem (CARDIZEM CD) extended release capsule 180 mg Daily   repaglinide (PRANDIN) tablet 1 mg TID AC   linagliptin (TRADJENTA) tablet 5 mg Daily   ipratropium-albuterol (DUONEB) nebulizer solution 1 ampule Q4H PRN   ALPRAZolam (XANAX) tablet 0.25 mg BID   mometasone-formoterol (DULERA) 100-5 MCG/ACT inhaler 2 puff BID   ipratropium-albuterol (DUONEB) nebulizer solution 1 ampule Q4H WA   pravastatin (PRAVACHOL) tablet 40 mg Daily   rOPINIRole (REQUIP) tablet 0.5 mg Nightly   ferrous sulfate tablet 325 mg BID   donepezil (ARICEPT) tablet 10 mg Daily   glucose (GLUTOSE) 40 % oral gel 15 g PRN   dextrose 50 % solution 12.5 g PRN   glucagon (rDNA) injection 1 mg PRN   dextrose 5 % solution PRN   insulin lispro (HUMALOG) injection pen 0-12 Units TID WC   insulin lispro (HUMALOG) injection pen 0-6 Units Nightly   [Held by provider] heparin 25,000 units in dextrose 5% 250 mL infusion Continuous   ondansetron (ZOFRAN) injection 4 mg Q6H PRN   acetaminophen (TYLENOL) tablet 650 mg Q4H PRN   polyethylene glycol hours.  XR CHEST PORTABLE   Final Result   Increasing right pleural effusion as compared to prior. Persistent adjacent   airspace disease. Left pleural effusion left-sided airspace disease is not significantly   changed. XR CHEST PORTABLE   Final Result   Increasing right greater than left multifocal airspace disease most   consistent with worsening pneumonia. Suspicion of superimposed congestive   heart failure with pulmonary vascular congestion and new small bilateral   pleural effusions. CT CHEST WO CONTRAST   Final Result   Bilateral multifocal pneumonia. No pleural effusion. XR CHEST STANDARD (2 VW)   Final Result   Stable study. Problem List  Principal Problem:    Community acquired bacterial pneumonia  Active Problems:    Type 2 diabetes mellitus ([address]    Aortic stenosis, severe    S/P TAVR (transcatheter aortic valve replacement)    COPD, mild (HCC)    Chronic hypoxemic respiratory failure (HCC)    Rapid atrial fibrillation (HCC)    Chronic diastolic heart failure (HCC)    Severe sepsis (HCC)    Atrial fibrillation with RVR ([address]  Resolved Problems:    * No resolved hospital problems. *       Assessment & Plan:     Acute hypoxic respiratory failure   - secondary to multifocal pneumonia with severe sepsis POA -  Still remains on 9l nc.   - we have been trying to wean slowly  -- we will continues to wean oxygen as tolerated   -wbc is improved to 16 today from, 23   -continue  Rocephin to 2 grams/day, DC Azithromycin  - Prednisone   Plan for hospice meeting today     Acute renal failure  - Elevated bun/crea/ initially on dry side, improved with gental hydration.   - Given Laxix x 1 for suspected fluid overloa  - monitor for now     DM with steroid induced hyperglycemia  - Glucose running high;  - continue ISS, home oral hypoglycemic regimen and add low dose Lantus    Afib with RVR on presentation - Rate improved; probably infection culprit for RVR   - Now on oral Cardizem  - Continues on Heparin drip; D/w one of the daughters about benefits/risks of NOAC(no personal h/o major bleeding, functional status good, on asa but does have easy bruising)  - Echocardiogram 5/24/2019 with EF 60-65%, Indeterminate diastolic function  - Cardiology consult appreciated    Dementia  - Continue Aricept         IV Access: peripheral  Shoemaker:   Diet: DIET GENERAL; Low Potassium  Code:DNR-CCA  DVT PPX Heparin drip  Disposition  Unclear  Condition   Lizabeth Winkler MD   5/30/2019 10:33 AM

## 2019-05-30 NOTE — PROGRESS NOTES
This RN entered patient's room for scheduled medications, patient alert in bed, reporting \"I can't breathe. \"  O2 sat 87% on 12L high flow O2, patient placed on non-rebreather mask. Respiratory therapist updated.

## 2019-05-30 NOTE — FLOWSHEET NOTE
Shift assessment complete. VSS. Pt. Is alert but lethargic at times. Pt. Able to answer questions but is not able to carry conversation. Daughter at bedside. POC discussed with daughter and daughter state she would like to speak with hospice due to her mothers' condition. Palliative care updated on POC according to daughter. Bed alarm engaged. Call light and bedside table within reach. 05/30/19 0900   Vital Signs   Temp 97 °F (36.1 °C)   Temp Source Temporal   Pulse 95   Heart Rate Source Brachial   Resp 16   BP (!) 157/67   BP Location Left upper arm   MAP (mmHg) 97   Patient Position Semi fowlers   Level of Consciousness 0   MEWS Score 1   Patient Currently in Pain Denies   Pain Assessment   Pain Assessment 0-10   Pain Level 0   Non-Pharmaceutical Pain Intervention(s) Declines   Response to Pain Intervention Patient Satisfied   Oxygen Therapy   SpO2 91 %   Pulse Oximeter Device Mode Intermittent   Pulse Oximeter Device Location Finger   O2 Device High flow nasal cannula   O2 Flow Rate (L/min) 12 L/min   Will continue to monitor.  No Novoa

## 2019-05-30 NOTE — PROGRESS NOTES
[facility] Note    Patient Active Problem List   Diagnosis    Acute systolic CHF    Type 2 diabetes mellitus (HCC)    Acute diastolic CHF (congestive heart failure) (HCC)    Acute respiratory failure (HCC)    Hiatal hernia    Aortic stenosis, severe    S/P TAVR (transcatheter aortic valve replacement)    5/12/16 LEFT IM nail    History of aortic stenosis    Nonrheumatic aortic valve stenosis    Shortness of breath    Acute dyspnea    COPD, mild (HCC)    Centrilobular emphysema (HCC)    Chronic hypoxemic respiratory failure ([address]    Community acquired bacterial pneumonia    Rapid atrial fibrillation (HCC)    Chronic diastolic heart failure (HCC)    Severe sepsis (HCC)    Atrial fibrillation with RVR (HCC)       Past Medical History:   has a past medical history of Back pain, Cancer Skin, CHF (congestive heart failure) ([address] Community acquired bacterial pneumonia, COPD, mild ([address] Diabetes mellitus ([address] Hiatal hernia, Hyperlipidemia, and Hypertension. Past Social History:   reports that she has never smoked. She has never used smokeless tobacco. She reports that she does not drink alcohol or use drugs. Subjective:    Off NRBM. Still has labored breathing. WBC count still high. Review of Systems   Constitutional: Positive for fatigue. Negative for activity change, appetite change, chills, fever and unexpected weight change. HENT: Negative for congestion and facial swelling. Eyes: Negative for photophobia, discharge and redness. Respiratory: Positive for shortness of breath. Negative for cough and chest tightness. Cardiovascular: Negative for chest pain, palpitations and leg swelling. Gastrointestinal: Negative for abdominal distention, abdominal pain, blood in stool, constipation, diarrhea, nausea and vomiting. Endocrine: Negative for cold intolerance, heat intolerance and polyuria.    Genitourinary: Negative for decreased urine volume, difficulty urinating, flank pain and hematuria. Musculoskeletal: Negative for joint swelling and neck pain. Neurological: Negative for dizziness, seizures, syncope, speech difficulty, light-headedness and headaches. Hematological: Does not bruise/bleed easily. Psychiatric/Behavioral: Negative for agitation, confusion and hallucinations. Objective:      /66   Pulse 83   Temp 97 °F (36.1 °C) (Temporal)   Resp 16   Ht 4' 11\" (1.499 m)   Wt 124 lb 3.2 oz (56.3 kg)   SpO2 92%   BMI 25.09 kg/m²     Wt Readings from Last 3 Encounters:   05/30/19 124 lb 3.2 oz (56.3 kg)   02/19/18 117 lb (53.1 kg)   11/21/17 117 lb (53.1 kg)       BP Readings from Last 3 Encounters:   05/30/19 118/66   02/26/19 139/64   08/21/18 (!) 125/58     Chest- rhonchi b/l  Heart-regular  Abd-soft  Ext- no edema    Labs  Hemoglobin   Date Value Ref Range Status   05/30/2019 10.7 (L) 12.0 - 16.0 g/dL Final     Hematocrit   Date Value Ref Range Status   05/30/2019 33.4 (L) 36.0 - 48.0 % Final     WBC   Date Value Ref Range Status   05/30/2019 16.8 (H) 4.0 - 11.0 K/uL Final     Platelets   Date Value Ref Range Status   05/30/2019 330 135 - 450 K/uL Final     Lab Results   Component Value Date    CREATININE 1.1 05/30/2019    BUN 50 (H) 05/30/2019     05/30/2019    K 4.8 05/30/2019     05/30/2019    CO2 28 05/30/2019     Fransisca-26  Cxray result noted    Assessment/Plan:  1. Acute kidney injury, creatinine on presentation was 1.4. In  08/2017, creatinine was 1.2. DELIO probably due to decreased renal  perfusion in the setting of Pneumonia, relative hypotension, atrial fibrillation  with rapid ventricular response and pneumonia. Non-oliguric. High risk for ATN. Crea better today. Lasix 20mg IV BID today. 2.  Hyponatremia. Better. 3.  Hypertension, controlled. 4.  Anemia, follow hemoglobin. 5.  Bilateral pneumonia on antibiotics, management as per Medicine.     6.  Elevated blood

## 2019-05-31 NOTE — PROGRESS NOTES
Shift assessment complete. VSS. Pt denies any pain. Pt resting quietly in bed wearing non rebreather mask. Pt family at bedside. Pt repositioned for comfort. Call light within reach. Heels red/ mushy, elevated off bed. No needs at this time. Awaiting transport pickup.

## 2019-05-31 NOTE — PROGRESS NOTES
Data- discharge order received, pt and daughter (appointed legal authority) verbalized agreement to discharge, disposition to [address] [place] [address] reviewed and signed by physician. Action- AVS prepared, ANA completed/ reported faxed by hospice nurse. Medications prescriptions scripts sent in packet to facility. Transfer code status: DNR-CCA,     Response- Hospice RN to call report to receiving facility. Pt belongings gathered, peripheral IV and keen catheter remain in place. Disposition to Discharged via ambulance to inpatient hospice by EMS transportation, no complications reported.

## 2019-06-02 NOTE — DISCHARGE SUMMARY
[address] [place] [address] DISCHARGE SUMMARY    Patient Demographics    Patient. Jazzy Walker  Date of Birth. 3/5/1918  MRN. [id number]     Primary care provider. Eve Sidhu MD  (Tel: [phone])    Admit date: 5/22/2019    Discharge date (blank if same as Note Date): 5/30/2019  Note Date: 6/2/2019     Reason for Hospitalization. Chief Complaint   Patient presents with    Shortness of Breath     brought in by family with c/o shortness of breath x couple of days. [facility] Course. Acute hypoxic respiratory failure   - secondary to multifocal pneumonia with severe sepsis POA -  Still remains on 9l nc.  - contiue to worsen she was finally made [facility] and discharged to inpatient hospice      Acute renal failure  - Elevated bun/crea/ initially on dry side, improved with gental hydration. - monitor for now      DM with steroid induced hyperglycemia  -      Afib with RVR on presentation - Rate improved; probably infection culprit for RVR   - Now on oral Cardizem  -       Consults. IP CONSULT TO HOSPITALIST  IP CONSULT TO CARDIOLOGY  IP CONSULT TO PALLIATIVE CARE  IP CONSULT TO NEPHROLOGY  IP CONSULT TO NEPHROLOGY  IP CONSULT TO HOSPICE  IP CONSULT TO SPIRITUAL SERVICES    Physical examination on discharge day. BP (!) 154/73   Pulse 112   Temp 97 °F (36.1 °C) (Temporal)   Resp 20   Ht 4' 11\" (1.499 m)   Wt 124 lb 3.2 oz (56.3 kg)   SpO2 90%   BMI 25.09 kg/m²   General appearance. Alert. Looks comfortable. HEENT. Sclera clear. Moist mucus membranes. Cardiovascular. Regular rate and rhythm, normal S1, S2. No murmur. Respiratory. Not using accessory muscles. Clear to auscultation bilaterally, no wheeze. Gastrointestinal. Abdomen soft, non-tender, not distended, normal bowel sounds  Neurology. Facial symmetry. No speech deficits. Moving all extremities equally. Extremities.  No edema in lower extremities. Skin. Warm, dry, normal turgor    Condition at time of discharge stable     Medication instructions provided to patient at discharge. Medication List      START taking these medications    diltiazem 180 MG extended release capsule  Commonly known as:  CARDIZEM CD  Take 1 capsule by mouth daily     LORazepam 2 MG/ML concentrated solution  Commonly known as:  LORAzepam intensol  Take 0.5 mLs by mouth every 8 hours as needed (anxiety, sob) for up to 14 days. morphine sulfate 20 MG/ML concentrated oral solution  Take 0.5 mLs by mouth every 2 hours as needed for Pain for up to 3 days. CONTINUE taking these medications    * albuterol sulfate  (90 Base) MCG/ACT inhaler  Notes to patient:  Use:bronchodilator, to open airways, rescue inhaler  Side effects: nervousness, jitteriness, shakiness, headache, fast heartbeat     * albuterol (2.5 MG/3ML) 0.083% nebulizer solution  Commonly known as:  PROVENTIL  Notes to patient:  Use:bronchodilator, to open airways, rescue inhaler  Side effects: nervousness, jitteriness, shakiness, headache, fast heartbeat     ALPRAZolam 0.25 MG tablet  Commonly known as:  Virginie Pierson  Notes to patient:  Use: eases anxiety and calms the nerves  Side effects: feeling lightheaded, sleepy, having blurred eyesight, confusion     furosemide 20 MG tablet  Commonly known as:  LASIX  Notes to patient:  Use: treat heart failure, fluid retention, lower blood pressure.        Side effects: frequent urination, weakness, muscle cramps, increased sensitivity to light, nausea, and dizziness     JANUVIA 50 MG tablet  Generic drug:  SITagliptin  Notes to patient:  Use: treats diabetes or high blood sugar  Side effects: low blood sugar, shakiness, dizziness, headache and upset stomach     nateglinide 60 MG tablet  Commonly known as:  STARLIX     OXYGEN     pravastatin 40 MG tablet  Commonly known as:  PRAVACHOL  Notes to patient:  Use: treat high cholesterol  Side effects: muscle pain,/weakness, headache, dizziness, nausea     rOPINIRole 0.5 MG tablet  Commonly known as:  REQUIP  Notes to patient:  Use: For Parkinsons  Side Effects: low blood pressure, dizziness     SYMBICORT 160-4.5 MCG/ACT Aero  Generic drug:  budesonide-formoterol  Notes to patient:  Use: to treat asthma or COPD  Side effects: runny nose, sore throat, headache, oral yeast infection         * This list has 2 medication(s) that are the same as other medications prescribed for you. Read the directions carefully, and ask your doctor or other care provider to review them with you. STOP taking these medications    amLODIPine 5 MG tablet  Commonly known as:  NORVASC     aspirin 81 MG EC tablet     docusate sodium 100 MG capsule  Commonly known as:  COLACE     donepezil 5 MG tablet  Commonly known as:  ARICEPT     Iron 90 (18 Fe) MG Tabs     lisinopril 5 MG tablet  Commonly known as:  PRINIVIL;ZESTRIL     vitamin D 1000 UNIT Tabs tablet  Commonly known as:  CHOLECALCIFEROL           Where to Get Your Medications      You can get these medications from any pharmacy    Bring a paper prescription for each of these medications  · LORazepam 2 MG/ML concentrated solution  · morphine sulfate 20 MG/ML concentrated oral solution     Information about where to get these medications is not yet available    Ask your nurse or doctor about these medications  · diltiazem 180 MG extended release capsule         Discharge recommendations given to patient. Follow Up. pcp in 1 week   Disposition. Inpatient hopsice   Activity. activity as tolerated  Diet: No diet orders on file      Spent 45  minutes in discharge process.     Signed:  Guanako Lay MD     6/2/2019 1:51 PM

## 2019-06-04 ENCOUNTER — TELEPHONE (OUTPATIENT)
Dept: CARDIOLOGY CLINIC | Age: 84
End: 2019-06-04

## 2019-06-04 NOTE — TELEPHONE ENCOUNTER
[facility] sent a fax informing us of Death. Sisi Ortega passed away 19 dx of Sepsis. Sisi Ortega has been marked . FYI.

## 2019-09-23 NOTE — ED PROVIDER NOTES
[facility]        Pt Name: Fabiana Leong  MRN: [id number]  [place] 3/5/1918  Date of evaluation: 5/22/2019  Provider: JOYCE Law CNP  PCP: Jamia Falcon MD    This patient was seen and evaluated by the attending physician Alvinorachele Conley III, [address]       Chief Complaint   Patient presents with    Shortness of Breath     brought in by family with c/o shortness of breath x couple of days. HISTORY OF PRESENT ILLNESS   (Location/Symptom, Timing/Onset, Context/Setting, Quality, Duration, Modifying Factors, Severity)  Note limiting factors. Fabiana Leong is a 80 y.o. female presents to the emergency department with shortness of breath with moist cough x several days. She does wear oxygen chronically been no history of cigarette smoking. She generally wears 2 L but is requiring 4 L today. She is confused per family, she is generally very alert and oriented. Daughter states she's been confused for several days. She denies any injury or trauma. Denies fever, states that she is not eating. Denies any headache,  lightheadedness, dizziness, visual disturbances. No chest pain or pressure. No neck or back pain. No abdominal pain, nausea, vomiting, diarrhea, constipation, or dysuria. No rash. Nursing Notes were all reviewed and agreed with or any disagreements were addressed  in the HPI. REVIEW OF SYSTEMS    (2-9 systems for level 4, 10 or more for level 5)     Review of Systems   Constitutional: Positive for activity change, appetite change and fatigue. Negative for chills and fever. HENT: Positive for congestion. Respiratory: Positive for cough, chest tightness, shortness of breath and wheezing. Cardiovascular: Negative for chest pain. Gastrointestinal: Negative for abdominal pain, diarrhea, nausea and vomiting. Genitourinary: Negative for dysuria.    Psychiatric/Behavioral: ROPINIROLE (REQUIP) 0.5 MG TABLET    Take 1 mg by mouth nightly Takes 1 or 2 in the evening    SITAGLIPTIN (JANUVIA) 50 MG TABLET    Take 50 mg by mouth daily    VITAMIN D (CHOLECALCIFEROL) 1000 UNIT TABS TABLET      Take 50,000 Units by mouth once a week          ALLERGIES     Patient has no known allergies. FAMILYHISTORY       Family History   Problem Relation Age of Onset    Stroke Sister     High Blood Pressure Sister     Stroke Brother     High Cholesterol Neg Hx     Heart Disease Neg Hx           SOCIAL HISTORY       Social History     Socioeconomic History    Marital status:       Spouse name: Not on file    Number of children: Not on file    Years of education: Not on file    Highest education level: Not on file   Occupational History    Not on file   Social Needs    Financial resource strain: Not on file    Food insecurity:     Worry: Not on file     Inability: Not on file    Transportation needs:     Medical: Not on file     Non-medical: Not on file   Tobacco Use    Smoking status: Never Smoker    Smokeless tobacco: Never Used   Substance and Sexual Activity    Alcohol use: No    Drug use: No    Sexual activity: Never   Lifestyle    Physical activity:     Days per week: Not on file     Minutes per session: Not on file    Stress: Not on file   Relationships    Social connections:     Talks on phone: Not on file     Gets together: Not on file     Attends Restorationism service: Not on file     Active member of club or organization: Not on file     Attends meetings of clubs or organizations: Not on file     Relationship status: Not on file    Intimate partner violence:     Fear of current or ex partner: Not on file     Emotionally abused: Not on file     Physically abused: Not on file     Forced sexual activity: Not on file   Other Topics Concern    Not on file   Social History Narrative    Not on file       SCREENINGS             PHYSICAL EXAM    (up to 7 for level 4, 8 or more for level 5)     ED Triage Vitals   BP Temp Temp Source Pulse Resp SpO2 Height Weight   05/22/19 1515 05/22/19 1515 05/22/19 1515 05/22/19 1515 05/22/19 1515 05/22/19 1542 05/22/19 1515 05/22/19 1515   112/65 98.1 °F (36.7 °C) Infrared 134 18 97 % 4' 11\" (1.499 m) 117 lb (53.1 kg)       Physical Exam   Constitutional: She is oriented to person, place, and time. She appears well-developed and well-nourished. HENT:   Head: Normocephalic and atraumatic. Right Ear: External ear normal.   Left Ear: External ear normal.   Eyes: Right eye exhibits no discharge. Left eye exhibits no discharge. Neck: Normal range of motion. Neck supple. No JVD present. Cardiovascular: An irregularly irregular rhythm present. Tachycardia present. A.fib with RVR   Pulmonary/Chest: Effort normal. No respiratory distress. Musculoskeletal: Normal range of motion. Neurological: She is alert and oriented to person, place, and time. Skin: Skin is warm and dry. She is not diaphoretic. No pallor. Psychiatric: She has a normal mood and affect. Her behavior is normal.   Nursing note and vitals reviewed.       DIAGNOSTIC RESULTS   LABS:    Labs Reviewed   CBC WITH AUTO DIFFERENTIAL - Abnormal; Notable for the following components:       Result Value    WBC 15.2 (*)     RBC 3.65 (*)     Hemoglobin 11.2 (*)     Hematocrit 35.4 (*)     Neutrophils # 14.1 (*)     Lymphocytes # 0.3 (*)     Bands Relative 14 (*)     Toxic Granulation Present (*)     Dohle Bodies Present (*)     All other components within normal limits    Narrative:     Performed at:  [facility] [address], [facility], [address]   Phone [phone]   COMPREHENSIVE METABOLIC PANEL - Abnormal; Notable for the following components:    Anion Gap 17 (*)     Glucose 398 (*)     BUN 47 (*)     CREATININE 1.4 (*)     GFR Non- 34 (*)     GFR African American 42 (*)     Albumin/Globulin Ratio 0.8 (*)     ALT 9 (*)     AST 12 (*) All other components within normal limits    Narrative:     Performed at:  [facility]  [address], [address]   Phone [phone]   LACTATE, SEPSIS - Abnormal; Notable for the following components:    Lactic Acid, Sepsis 2.7 (*)     All other components within normal limits    Narrative:     Performed at:  [facility] [address], [address]   Phone [phone]   BRAIN NATRIURETIC PEPTIDE - Abnormal; Notable for the following components:    Pro-BNP 6,849 (*)     All other components within normal limits    Narrative:     Performed at:  [facility] [address], [address]   Phone [phone]   CULTURE BLOOD #1   CULTURE BLOOD #2   TROPONIN    Narrative:     Performed at:  [facility] [address], [address]   Phone [phone]   PROTIME-INR    Narrative:     Performed at:  [facility] [address] [place]   Phone [phone]   LACTATE, SEPSIS       All other labs were within normal range or not returned as of this dictation. EKG: All EKG's are interpreted by the Emergency Department Physician who either signs orCo-signs this chart in the absence of a cardiologist.  Please see their note for interpretation of EKG. RADIOLOGY:   Non-plain film images such as CT, Ultrasound and MRI are read by the radiologist. Areli Siu radiographic images are visualized andpreliminarily interpreted by the  ED Provider with the below findings:        Interpretation perthe Radiologist below, if available at the time of this note:    CT CHEST WO CONTRAST   Final Result   Bilateral multifocal pneumonia. No pleural effusion. XR CHEST STANDARD (2 VW)   Final Result   Stable study.            Xr Chest Standard (2 Vw)    Result Date: 5/22/2019  EXAMINATION: TWO XRAY VIEWS OF THE CHEST 5/22/2019 3:38 pm COMPARISON: A 05/02/2017 HISTORY: ORDERING SYSTEM PROVIDED HISTORY: sob TECHNOLOGIST PROVIDED HISTORY: Reason for exam:->sob Ordering Physician Provided Reason for Exam: shortness of breath Acuity: Acute Type of Exam: Initial FINDINGS: No acute bony abnormality. The heart size and mediastinal contours are stable. A large hiatal hernia is present. The lungs are clear without overt edema, lobar consolidation or effusion. Stable study. PROCEDURES   Unless otherwise noted below, none     Procedures    CRITICAL CARE TIME   The total critical care time spent while evaluating and treating this patient was at least 48 minutes. This excludes time spent doing separately billable procedures. This includes time at the bedside, data interpretation, medication management, obtaining critical history from collateral sources if the patient is unable to provide it directly, and physician consultation. Specifics of interventions taken and potentially life-threatening diagnostic considerations are listed above in the medical decision making.       CONSULTS:  IP CONSULT TO HOSPITALIST  IP CONSULT TO CARDIOLOGY      EMERGENCY DEPARTMENT COURSE and DIFFERENTIALDIAGNOSIS/MDM:   Vitals:    Vitals:    05/22/19 1858 05/22/19 1915 05/22/19 1930 05/22/19 1945   BP:  (!) 131/59 (!) 137/55 (!) 139/57   Pulse: 104 105 105 103   Resp:    16   Temp:       TempSrc:       SpO2: 95% 94% 94% 93%   Weight:       Height:           Patient was given thefollowing medications:  Medications   diltiazem injection 10 mg (10 mg Intravenous Given 5/22/19 1832)     Followed by   diltiazem 125 mg in dextrose 5 % 125 mL infusion (5 mg/hr Intravenous New Bag 5/22/19 1833)   azithromycin (ZITHROMAX) 500 mg in dextrose 5 % 250 mL IVPB (500 mg Intravenous New Bag 5/22/19 1948)   levalbuterol (XOPENEX) nebulization 1.26 mg (1.26 mg Nebulization Given 5/22/19 1612)   0.9 % sodium chloride bolus (0 mLs Intravenous Stopped 5/22/19 1753)   cefTRIAXone (ROCEPHIN) 1 g in sterile water 10 mL IV syringe (1 g Intravenous Given 5/22/19 1948)       Briefly, this is a 8 year old female that presents to the emergency department with shortness of breath with moist cough x several days. She does wear oxygen chronically been no history of cigarette smoking. She generally wears 2 L but is requiring 4 L today. She is confused per family, she is generally very alert and oriented. Daughter states she's been confused for several days. She denies any injury or trauma. Denies fever, states that she is not eating. INR 1.07. The patient is not anticoagulated. CBC shows a leukocytosis white count of 15,200. CMP is unremarkable. SEP-1 CORE MEASURE DATA    Classification: severe sepsis    Amount of fluids ordered: less than 30mL/kg because family does not want her fluid overloaded    Time at which sepsis was identified:1948    Broad-spectrum antibiotics chosen: azithromycin and rocephin based on sepsis order-set for a suspected source of: Pulmonary - Community Acquired    Repeat lactate level: pending    On reassessment after fluid resuscitation:   pending, to be completed by inpatient team    Patient will be admitted per hospitalist services. She has no history of a.fib, she was treated with cardizem in the ER. FINAL IMPRESSION      1. Rapid atrial fibrillation ([address]    2. Multifocal pneumonia    3.  Severe sepsis [facility])          DISPOSITION/PLAN   DISPOSITION        PATIENT REFERREDTO:  Stan Pinto MD  [facility], [place] [id number]            DISCHARGE MEDICATIONS:  New Prescriptions    No medications on file       DISCONTINUED MEDICATIONS:  Discontinued Medications    No medications on file              (Please note that portions ofthis note were completed with a voice recognition program.  Efforts were made to edit the dictations but occasionally words are mis-transcribed.)    Roxy Tinsley, JOYCE - CNP (electronically signed)           JOYCE Azevedo - NISHA  05/22/19 2029 Imaging Studies/Labs Labs/EKG/Imaging Studies

## 2024-06-24 NOTE — PROGRESS NOTES
Occupational Therapy   Occupational Therapy Initial Assessment  Date: 2019   Patient Name: Art Bauer  MRN: [id number]     : 3/5/1918    Date of Service: 2019    Discharge Recommendations: Art Bauer scored a 13/24 on the AM-PAC ADL Inpatient form. Current research shows that an AM-PAC score of 17 or less is typically not associated with a discharge to the patient's home setting. Based on the patients AM-PAC score and their current ADL deficits, it is recommended that the patient have 3-5 sessions per week of Occupational Therapy at d/c to increase the patients independence. OT Equipment Recommendations  Equipment Needed: No    Assessment   Performance deficits / Impairments: Decreased functional mobility ; Decreased ADL status; Decreased ROM; Decreased strength;Decreased cognition;Decreased endurance;Decreased balance  Assessment: Pt is not at baseline level of function and will benefit from skilled OT in order to address the above limitations. Treatment Diagnosis: Decreased functional mobility, ADL status, ROM, strength, cognition, functional activity tolerance, balance related to PNA and a-fib  Prognosis: Good  Decision Making: Medium Complexity  History: mod I in ADLs, lives with daughter, h/o CHF, COPD, HTN  Exam: six-clicks, balance, ROM, vitals  Assistance / Modification: max A of 2, dep for socks  Patient Education: Role of OT, OT eval, pursed lip breathing, POC, d/c recommendations, benefits of sitting up in chair  Barriers to Learning: hearing, cognition  REQUIRES OT FOLLOW UP: Yes  Activity Tolerance  Activity Tolerance: Patient limited by fatigue;Treatment limited secondary to medical complications (free text)  Activity Tolerance: Limited by elevated HR  Safety Devices  Safety Devices in place: Yes  Type of devices: All fall risk precautions in place; Patient at risk for falls; Left in chair;Call light within reach; Chair alarm in place;Nurse notified(recommended to NSG to use yousuf maru for transfer back to bed)           Patient Diagnosis(es): The primary encounter diagnosis was Rapid atrial fibrillation ([facility] Diagnoses of Multifocal pneumonia and Severe sepsis ([address] were also pertinent to this visit. has a past medical history of Back pain, Cancer Skin, CHF (congestive heart failure) ([address] Community acquired bacterial pneumonia, COPD, mild ([address] Diabetes mellitus ([address] Hiatal hernia, Hyperlipidemia, and Hypertension. has a past surgical history that includes Carpal tunnel release; Hysterectomy; skin biopsy; Cataract removal; and Intertrochanteric hip fract. Surgery (Left, 5/12/2016). Treatment Diagnosis: Decreased functional mobility, ADL status, ROM, strength, cognition, functional activity tolerance, balance related to PNA and a-fib      Restrictions  Restrictions/Precautions  Restrictions/Precautions: Fall Risk, Up as Tolerated(high fall risk)  Position Activity Restriction  Other position/activity restrictions: Candice Pedroza is a 80 y.o. female presents to the emergency department with shortness of breath with moist cough x several days. She does wear oxygen chronically been no history of cigarette smoking. She generally wears 2 L but is requiring 4 L on day of admission. She is confused per family, she is generally very alert and oriented. Daughter states she's been confused for several days. She denies any injury or trauma. Denies fever, states that she is not eating. Dx'd with a-fib and PNA    Subjective   General  Chart Reviewed: Yes  Additional Pertinent Hx: CHF, COPD, HTN, CTR, L hip fx sx 2016  Family / Caregiver Present: Yes(daughter present in beginning and end of eval)  Diagnosis: PNA, a-fib  Subjective  Subjective: Pt partially seated EOB to L with LEs dangling and L side of trunk supported by elevated HOB. Pt agreeable to evaluation. General Comment  Comments: RN, lonny Haley'argentina minimal activity only secondary to elevated HR.  Vitals monitored throughout evaluation. RN increased supplemental O2 from 3L O2 to 5L O2 prior to activity. O2 sats 91%, -125 supine in bed. Pt assisted to EOB, HR in similar range, O2 sats increased to 94-95%. During partial stand, HR increased to 140s and decreased to 115-125 once seated EOB. During and after transfer to chair, HR remained in 115-125. O2 sats decreased to mid 80s% and increased to 90-91% once in chair. Pain Assessment  Pain Assessment: 0-10  Pain Level: 0  Non-Pharmaceutical Pain Intervention(s): Declines  Response to Pain Intervention: Patient Satisfied  Pulse Oximeter Device Mode: Intermittent  Pulse Oximeter Device Location: Finger  O2 Device: Nasal cannula  O2 Flow Rate (L/min): 5 L/min  Social/Functional History  Social/Functional History  Lives With: Daughter(daughter, Jerrica Cota)  Type of Home: House  Home Layout: One level  Home Access: Level entry  Bathroom Shower/Tub: Shower chair with back, Tub/Shower unit  Bathroom Toilet: Standard  Bathroom Equipment: Toilet raiser(with loop grab bar)  Bathroom Accessibility: Walker accessible  Home Equipment: Rolling walker, Oxygen, Reacher, Alert Button(has alert button, wears it when daughter is not there)  Receives Help From: Family  ADL Assistance: Independent  Homemaking Responsibilities: No  Ambulation Assistance: Independent(with RW)  Transfer Assistance: Independent  Active : No  Leisure & Hobbies: reading, crocheting  Additional Comments: Pt reports no recent falls. Pt does not have 24/7 care available       Objective   Vision: Impaired  Vision Exceptions: Wears glasses at all times  Hearing: Exceptions to [facility]  Hearing Exceptions: Hard of hearing/hearing concerns; No hearing aid    Orientation  Overall Orientation Status: Impaired  Orientation Level: Oriented to place;Oriented to person;Disoriented to time;Disoriented to situation  Observation/Palpation  Posture: Fair  Observation: forward head, rounded shoulders  Balance  Sitting Balance: Contact guard assistance(progressing to SBA - pt tolerated sitting EOB x8-10 min, cues for pursed lip breathing technique)  Standing Balance: Dependent/Total(max A of 2 SPT)  Standing Balance  Time: x<3 sec; x5 sec  Activity: partial stance from EOB; SPT from EOB to chair  Comment: pt's LEs buckling and pt going into posterior lean when attempting to stand to RW; pt transferred back onto EOB and required max A of 2 to pivot over to bedside chair  ADL  LE Dressing: Dependent/Total(to don B socks)  Additional Comments: No other ADLs addressed - pt declined need to toilet  Tone RUE  RUE Tone: Normotonic  Tone LUE  LUE Tone: Normotonic  Coordination  Movements Are Fluid And Coordinated: Yes     Bed mobility  Supine to Sit: Dependent/Total(mod A of 2)  Sit to Supine: Contact guard assistance(coming from supported seated EOB on L side of bed)  Transfers  Stand Pivot Transfers: Dependent/Total(max A of 2)  Sit to stand: Dependent/Total(mod A of 2 partial stand from EOB)  Stand to sit: Dependent/Total(max A of 2 back to EOB)  Vision - Basic Assessment  Prior Vision: Wears glasses all the time  Visual History: No significant visual history  Patient Visual Report: No visual complaint reported. Cognition  Overall Cognitive Status: Exceptions  Arousal/Alertness: Delayed responses to stimuli  Following Commands: Follows one step commands with repetition; Follows one step commands with increased time  Attention Span: Difficulty attending to directions  Memory: Decreased recall of biographical Information;Decreased recall of recent events;Decreased short term memory  Safety Judgement: Decreased awareness of need for assistance  Problem Solving: Decreased awareness of errors  Insights: Decreased awareness of deficits  Initiation: Requires cues for some  Sequencing: Requires cues for some        Sensation  Overall Sensation Status: WFL        LUE AROM (degrees)  LUE AROM : Exceptions  LUE General AROM: distally appeared [facility]  L Shoulder Flexion Dl